# Patient Record
Sex: FEMALE | Race: WHITE | HISPANIC OR LATINO | Employment: UNEMPLOYED | URBAN - METROPOLITAN AREA
[De-identification: names, ages, dates, MRNs, and addresses within clinical notes are randomized per-mention and may not be internally consistent; named-entity substitution may affect disease eponyms.]

---

## 2017-01-05 ENCOUNTER — APPOINTMENT (OUTPATIENT)
Dept: LAB | Facility: HOSPITAL | Age: 37
End: 2017-01-05

## 2017-01-05 DIAGNOSIS — Z12.73 ENCOUNTER FOR SCREENING FOR MALIGNANT NEOPLASM OF OVARY: ICD-10-CM

## 2017-01-05 LAB — CANCER AG125 SERPL-ACNC: 7.7 U/ML (ref 0–30)

## 2017-01-05 PROCEDURE — 36415 COLL VENOUS BLD VENIPUNCTURE: CPT

## 2017-01-05 PROCEDURE — 86304 IMMUNOASSAY TUMOR CA 125: CPT

## 2017-02-08 ENCOUNTER — LAB REQUISITION (OUTPATIENT)
Dept: LAB | Facility: HOSPITAL | Age: 37
End: 2017-02-08

## 2017-02-08 ENCOUNTER — ALLSCRIPTS OFFICE VISIT (OUTPATIENT)
Dept: OTHER | Facility: OTHER | Age: 37
End: 2017-02-08

## 2017-02-08 DIAGNOSIS — Z11.3 ENCOUNTER FOR SCREENING FOR INFECTIONS WITH PREDOMINANTLY SEXUAL MODE OF TRANSMISSION: ICD-10-CM

## 2017-02-08 PROCEDURE — 87491 CHLMYD TRACH DNA AMP PROBE: CPT | Performed by: OBSTETRICS & GYNECOLOGY

## 2017-02-08 PROCEDURE — 87591 N.GONORRHOEAE DNA AMP PROB: CPT | Performed by: OBSTETRICS & GYNECOLOGY

## 2017-02-10 LAB
CHLAMYDIA DNA CVX QL NAA+PROBE: NORMAL
N GONORRHOEA DNA GENITAL QL NAA+PROBE: NORMAL

## 2017-03-30 ENCOUNTER — GENERIC CONVERSION - ENCOUNTER (OUTPATIENT)
Dept: OTHER | Facility: OTHER | Age: 37
End: 2017-03-30

## 2017-03-30 DIAGNOSIS — Z11.3 ENCOUNTER FOR SCREENING FOR INFECTIONS WITH PREDOMINANTLY SEXUAL MODE OF TRANSMISSION: ICD-10-CM

## 2018-01-09 NOTE — PROGRESS NOTES
Discussion/Summary  Discussion Summary:   29-year-old  with suspected endometriosis of her  scar  patient fully counseled to excision of this endometriosis  Hao Romero patient reports understanding consent and no further questions  Patient was advised that she should be nothing by mouth after midnight and to use the Hibiclens wash, she was given information on this  her surgery is already scheduled with Dr Simeon Fajardo and the GYN resident staff  Chief Complaint  Chief Complaint Free Text Note Form: Patient is here for H&P for surgery  History of Present Illness  HPI: patient is a 35- with history of 2 prior  sections  She has 2-3 cm x 2-3 cm tender lump in her  scar just to the right of the midline  This became larger after her second  section and after speaking with the physicians at her previous appointment she was able to take note that her pain does increase and the area does swell at the time of her menses  This area is tender all the time since her last  in  is not grown significantly but swells at the time of menses, is more tender, and goes back to its prior size about a week after her cycle  Patient had an ultrasound which showed this to be consistent with possible endometriosis of her scar  Patient was fully counseled to excision of the endometriosis nodule, we also discussed that if we found invasion deeper that more work may need to be done on the area or possibly mesh may be needed for repair though this is highly unlikely  I did discuss with her that the area would possibly be indented after surgery and would fill in somewhat  Hospital Based Practices Required Assessment:   Pain Assessment   the patient states they do not have pain  Abuse And Domestic Violence Screen    Yes, the patient is safe at home  The patient states no one is hurting them  Depression And Suicide Screen  No, the patient has not had thoughts of hurting themself  No, the patient has not felt depressed in the past 7 days  Prefered Language is  [place]  Primary Language is  [place]  Active Problems    1  Abdominal wall mass of suprapubic region (789 39) (R19 09)   2  Leg cramping (729 82) (R25 2)   3  Recurrent oral herpes simplex (054 2) (B00 2)   4  Well female exam with routine gynecological exam (V72 31) (Z01 419)    Surgical History    1  History of  Section    Family History    1  No pertinent family history    2  Family history of malignant neoplasm of uterus (V16 49) (Z80 49)    Social History    · Never a smoker    Current Meds   1  Acyclovir 400 MG Oral Tablet; Therapy: (Dilma Blower) to Recorded   2  No Reported Medications Recorded    Allergies    1  No Known Drug Allergies    Vitals  Vital Signs [Data Includes: Current Encounter]    Recorded: 97WSF3885 25:38XQ   Systolic 699   Diastolic 78   Height 5 ft 5 in   Weight 121 lb 6 oz   BMI Calculated 20 2   BSA Calculated 1 6     Physical Exam    Constitutional   General appearance: No acute distress, well appearing and well nourished  Pulmonary   Respiratory effort: No increased work of breathing or signs of respiratory distress  Auscultation of lungs: Clear to auscultation  Cardiovascular   Auscultation of heart: Normal rate and rhythm, normal S1 and S2, no murmurs  Abdomen   Abdomen: Abnormal   just of the right of the midline on her  section scar is a nodular tender area about 2 5 cm x 2 5 cm  Liver and spleen: No hepatomegaly or splenomegaly  Examination for hernias: No hernias appreciated  Psychiatric   Orientation to person, place, and time: Normal     Mood and affect: Normal        Results/Data  Encounter Results   Screen Ebola Flowsheet [id number] 09:40AM      Test Name Result Flag Reference   Exposure to Ebola Virus - Within 21 Days No         Attending Note  Attending Note: Attending Note: I interviewed and examined the patient        Signatures Electronically signed by : Sorin Dumont MD; Jan 21 2016 10:34AM EST                       (Author)

## 2018-01-12 VITALS
HEIGHT: 65 IN | DIASTOLIC BLOOD PRESSURE: 66 MMHG | SYSTOLIC BLOOD PRESSURE: 121 MMHG | WEIGHT: 126.5 LBS | BODY MASS INDEX: 21.08 KG/M2

## 2018-01-12 NOTE — MISCELLANEOUS
Provider Comments  Provider Comments:   pt no showed today      Signatures   Electronically signed by : Rocío Nieto, ; Nov 1 2016  2:47PM EST                       (Author)

## 2018-01-16 NOTE — PROGRESS NOTES
Discussion/Summary   Discussion Summary:    40 yo  sexually active female on seasonique presents c/o hx of herpes labialis  Oral HSV: acyclovir 400 mg bid for supression; valacyclovir 500 bid for acute outbreaks Rx sent      History of Present Illness   HPI: 40 yo  sexually active female on seasonique presents c/o hx of herpes labialis  Review of Systems   Focused-Female:      Constitutional: no fever-- and-- no chills  Integumentary: rash,-- skin wound-- and-- cold sore on lower lip  Active Problems   1  Dysmenorrhea (625 3) (N94 6)   2  Encounter for management and injection of depo-Provera (V25 49) (Z30 42)   3  Encounter to establish care with new doctor (V65 8) (Z76 89)   4  Endometriosis (617 9) (N80 9)   5  Endometriosis in scar of skin (617 6) (N80 6)   6  Leg cramping (729 82) (R25 2)   7  Oral herpes simplex, not currently active (054 9) (B00 9)   8  Ovarian cancer screening (V76 46) (Z12 73)   9  Recurrent oral herpes simplex (054 2) (B00 2)   10  Routine screening for STI (sexually transmitted infection) (V74 5) (Z11 3)   11  Surgical followup (V6 00) (Z09)   12  Well female exam with routine gynecological exam (V72 31) (Z01 419)    Surgical History   1  History of  Section    Family History   Mother    1  No pertinent family history  Aunt    2  Family history of malignant neoplasm of uterus (V16 49) (Z80 49)    Social History    · Never a smoker    Current Meds    1  Acyclovir 400 MG Oral Tablet; For chronic herpes labialis supression, take one 400 mg     tablet every 12 hours; Therapy: 33YQV4661 to (Evaluate:2018)  Requested for: 32ANN1376; Last     Rx:2017 Ordered   2  ALPRAZolam 0 5 MG Oral Tablet; one tablet by mouth one hour before office visit; Therapy: 63XED7671 to (Last Rx:2016) Ordered   3  Daily Value Multivitamin Oral Tablet; Therapy: (Recorded:73Odm8531) to Recorded   4   Hydrocodone-Ibuprofen 7 5-200 MG Oral Tablet; one tablet by mouth one hour before     office visit; Therapy: 26BNI8793 to (Last Rx:12Oct2016) Ordered   5  Levonorgest-Eth Estrad 91-Day 0 15-0 03 &0 01 MG Oral Tablet (Seasonique); TAKE 1     TABLET DAILY; Therapy: 47JYX6644 to (MKQVVLVH:60HUY0797)  Requested for: 90WPY2846; Last     Rx:30Mar2017 Ordered   6  ValACYclovir HCl - 500 MG Oral Tablet; For acute herpes labialis outbreaks, take one 500     mg tablet every 12 hours for 3 days; Therapy: 24AJV9414 to (Evaluate:22Jun2017)  Requested for: 30IAD3394; Last     Rx:30Mar2017 Ordered    Allergies   1  No Known Drug Allergies    Signatures    Electronically signed by : DEBBIE Paredes ; Jun 20 2017  3:25PM EST                       (Author)     Electronically signed by :  Kristina Montes, ; Jan 17 2018  4:07PM EST                       (Administrative)

## 2018-01-22 VITALS
DIASTOLIC BLOOD PRESSURE: 79 MMHG | SYSTOLIC BLOOD PRESSURE: 116 MMHG | WEIGHT: 124 LBS | BODY MASS INDEX: 20.66 KG/M2 | HEIGHT: 65 IN

## 2018-03-07 NOTE — PROGRESS NOTES
Discussion/Summary    44yo  with recurrent  scar endometrioma scheduled for resection on   She had a prior scar revision which showed endometriosis  She is again having the pain  Due to her self pay status, an attempt will be made to excise the lesion in clinic with local anesthetic  ABD USN showed 1 8x0 9cm solid nodule with internal blood flow in the superficial soft tissues anterior to the rectus muscle    She will be seen on 10/12 for evaluation by Dr Demetrius Peñaloza and be scheduled for an in office resection depending on exam findings        Signatures   Electronically signed by : DEBBIE Springer ; Oct  5 2016  4:48PM EST                       (Author)    Electronically signed by : Phuong Robles DO; Oct  6 2016 10:10PM EST

## 2018-03-07 NOTE — PROGRESS NOTES
Discussion/Summary    After chart review; I concur with proceeding to OR for planned excision of abdominal wall endometriosis given recurrent pain  I would avoid delay in surgical management due significant disturbance of quality of life        Signatures   Electronically signed by : Herman Montiel DO; Sep 28 2016  1:33PM EST                       (Author)

## 2018-09-02 ENCOUNTER — TRANSCRIBE ORDERS (OUTPATIENT)
Dept: URGENT CARE | Age: 38
End: 2018-09-02

## 2018-09-02 ENCOUNTER — OFFICE VISIT (OUTPATIENT)
Dept: URGENT CARE | Age: 38
End: 2018-09-02

## 2018-09-02 VITALS
HEART RATE: 62 BPM | HEIGHT: 65 IN | WEIGHT: 132 LBS | OXYGEN SATURATION: 100 % | TEMPERATURE: 98.1 F | BODY MASS INDEX: 21.99 KG/M2 | DIASTOLIC BLOOD PRESSURE: 72 MMHG | RESPIRATION RATE: 12 BRPM | SYSTOLIC BLOOD PRESSURE: 122 MMHG

## 2018-09-02 DIAGNOSIS — M79.671 RIGHT FOOT PAIN: ICD-10-CM

## 2018-09-02 DIAGNOSIS — M77.51 TENDINITIS OF RIGHT FOOT: Primary | ICD-10-CM

## 2018-09-02 PROCEDURE — G0382 LEV 3 HOSP TYPE B ED VISIT: HCPCS

## 2018-09-02 RX ORDER — MULTIVIT-MIN/IRON FUM/FOLIC AC 7.5 MG-4
1 TABLET ORAL DAILY
COMMUNITY

## 2018-09-02 RX ORDER — NAPROXEN 500 MG/1
500 TABLET ORAL 2 TIMES DAILY WITH MEALS
Qty: 20 TABLET | Refills: 0 | Status: SHIPPED | OUTPATIENT
Start: 2018-09-02 | End: 2021-01-21 | Stop reason: ALTCHOICE

## 2018-09-02 NOTE — PATIENT INSTRUCTIONS
No acute fracture on xray  Will start Naproxen BID  Rest   Ice as needed  Follow up with Podiatry for continued pain

## 2018-09-03 NOTE — PROGRESS NOTES
[facility]        NAME: London Conway is a 40 y o  female  : 1980    MRN: [id number]  DATE: 2018  TIME: 8:55 PM    Assessment and Plan   Tendinitis of right foot [M77 51]  1  Tendinitis of right foot     2  Right foot pain  XR foot 3+ vw right    naproxen (NAPROSYN) 500 mg tablet         Patient Instructions     Patient Instructions   No acute fracture on xray  Will start Naproxen BID  Rest   Ice as needed  Follow up with Podiatry for continued pain  Chief Complaint     Chief Complaint   Patient presents with    Foot Pain     across the top of the foot    with stepping         History of Present Illness   Jeanna García presents to the clinic c/o    This is 40year old female here today with right foot pain  She denies any injury  Pain is over the top of her foot  Pain is worse with walking  She has not tried any ice or motrin  She has had pain for about 4 weeks  Review of Systems   Review of Systems   Constitutional: Negative  HENT: Negative  Respiratory: Negative  Cardiovascular: Negative  Musculoskeletal: Positive for arthralgias  Psychiatric/Behavioral: Negative            Current Medications     Long-Term Prescriptions   Medication Sig Dispense Refill    Multiple Vitamins-Minerals (MULTIVITAMIN WITH MINERALS) tablet Take 1 tablet by mouth daily      naproxen (NAPROSYN) 500 mg tablet Take 1 tablet (500 mg total) by mouth 2 (two) times a day with meals 20 tablet 0       Current Allergies     Allergies as of 2018    (No Known Allergies)            The following portions of the patient's history were reviewed and updated as appropriate: allergies, current medications, past family history, past medical history, past social history, past surgical history and problem list     Objective   /72   Pulse 62   Temp 98 1 °F (36 7 °C) (Temporal)   Resp 12   Ht 5' 5" (1 651 m)   Wt 59 9 kg (132 lb)   LMP 2016 (Approximate) SpO2 100%   BMI 21 97 kg/m²        Physical Exam     Physical Exam   Constitutional: She is oriented to person, place, and time  Cardiovascular: Normal rate, regular rhythm and normal heart sounds  Musculoskeletal:   TTP over the dorsum of foot near the 3rd and 4th toes, bruising  Full ROM  Neurological: She is alert and oriented to person, place, and time  Psychiatric: She has a normal mood and affect   Her behavior is normal      Xray: no acute fracture

## 2019-02-18 ENCOUNTER — OFFICE VISIT (OUTPATIENT)
Dept: OBGYN CLINIC | Facility: CLINIC | Age: 39
End: 2019-02-18

## 2019-02-18 VITALS
SYSTOLIC BLOOD PRESSURE: 124 MMHG | BODY MASS INDEX: 22.66 KG/M2 | DIASTOLIC BLOOD PRESSURE: 69 MMHG | WEIGHT: 136 LBS | HEIGHT: 65 IN | HEART RATE: 72 BPM

## 2019-02-18 DIAGNOSIS — B00.1 HERPES LABIALIS: ICD-10-CM

## 2019-02-18 DIAGNOSIS — Z01.419 ENCOUNTER FOR WELL WOMAN EXAM WITH ROUTINE GYNECOLOGICAL EXAM: ICD-10-CM

## 2019-02-18 DIAGNOSIS — Z72.51 HIGH RISK SEXUAL BEHAVIOR, UNSPECIFIED TYPE: Primary | ICD-10-CM

## 2019-02-18 PROCEDURE — 87491 CHLMYD TRACH DNA AMP PROBE: CPT | Performed by: OBSTETRICS & GYNECOLOGY

## 2019-02-18 PROCEDURE — 99395 PREV VISIT EST AGE 18-39: CPT | Performed by: OBSTETRICS & GYNECOLOGY

## 2019-02-18 PROCEDURE — 87591 N.GONORRHOEAE DNA AMP PROB: CPT | Performed by: OBSTETRICS & GYNECOLOGY

## 2019-02-18 RX ORDER — VALACYCLOVIR HYDROCHLORIDE 500 MG/1
TABLET, FILM COATED ORAL
COMMUNITY
Start: 2017-03-30 | End: 2020-07-29 | Stop reason: SDUPTHER

## 2019-02-18 RX ORDER — ACYCLOVIR 400 MG/1
TABLET ORAL
COMMUNITY
Start: 2017-03-30 | End: 2019-02-18 | Stop reason: SDUPTHER

## 2019-02-18 RX ORDER — ACYCLOVIR 400 MG/1
400 TABLET ORAL
Qty: 35 TABLET | Refills: 5 | Status: SHIPPED | OUTPATIENT
Start: 2019-02-18 | End: 2020-03-23 | Stop reason: SDUPTHER

## 2019-02-19 LAB
C TRACH DNA SPEC QL NAA+PROBE: NEGATIVE
N GONORRHOEA DNA SPEC QL NAA+PROBE: NEGATIVE

## 2019-02-19 NOTE — PROGRESS NOTES
Ruiz Theodore is a 45 y o  female who presents for annual well woman exam  Periods are regular every 28-30 days, lasting 4 days  No intermenstrual bleeding, spotting, or discharge  Pt has a hx of  scar endometriosis and herpes labialis  Reports an outbreak with sores inside her mouth about 2 weeks ago  GYN:  · No vaginal discharge, labial erythema or lesions, dyspareunia     · Menses are regular  · Contraception: vasectomy  · Patient is sexually active with 1 male partner  · Last pap smear was in   Results were NILM/HPV negative  · No gynecologic surgeries  OB:  ·  female  · Pregnancies were complicated by  x2, endometriosis of  scar  :  · No dysuria, urinary frequency or urgency  · No hematuria, flank pain, incontinence  Breast:  · Denies breast mass, skin changes, dimpling, reddening, nipple retraction  · No breast discharge  · Patient does not have a family history of breast, endometrial, or ovarian ca  General:  · Diet: counseled  · Exercise: walks  · Work: works cleaning houses intermittently   · ETOH use: no  · Tobacco use: no  · Recreational drug use: no    Screening:  · Cervical cancer: last pap smear in   Results were NILM/HPV negative  · Breast cancer: N/A  · Colon cancer: N/A  · STD screening: requesting testing today  Review of Systems  Pertinent items are noted in HPI          Objective      /69 (BP Location: Right arm, Patient Position: Sitting, Cuff Size: Standard)   Pulse 72   Ht 5' 5" (1 651 m)   Wt 61 7 kg (136 lb)   LMP 2019 (Within Days)   BMI 22 63 kg/m²     General:   alert, appears stated age and cooperative   Heart: regular rate and rhythm   Lungs: clear to auscultation bilaterally   Breast: breasts appear normal, no suspicious masses, no skin or nipple changes or axillary nodes   Abdomen: soft, non-tender, without masses or organomegaly and  incision scar hardened to touch Vulva: normal   Vagina: normal mucosa, normal discharge   Cervix: speculum exam deferred   Uterus: normal size, anteverted, mobile, non-tender   Adnexa: normal adnexa and no mass, fullness, tenderness          Assessment/Plan:  Problem List Items Addressed This Visit     None      Visit Diagnoses     High risk sexual behavior, unspecified type    -  Primary    Relevant Orders    Chlamydia/GC amplified DNA by PCR    Herpes labialis        Relevant Medications    valACYclovir (VALTREX) 500 mg tablet    acyclovir (ZOVIRAX) 400 MG tablet    Encounter for well woman exam with routine gynecological exam                  Agnieszka Mai MD  OB/GYN PGY-1  2/19/2019  11:09 AM

## 2019-07-22 ENCOUNTER — OFFICE VISIT (OUTPATIENT)
Dept: URGENT CARE | Age: 39
End: 2019-07-22
Payer: COMMERCIAL

## 2019-07-22 VITALS
TEMPERATURE: 98.1 F | DIASTOLIC BLOOD PRESSURE: 69 MMHG | SYSTOLIC BLOOD PRESSURE: 134 MMHG | WEIGHT: 130.8 LBS | BODY MASS INDEX: 21.79 KG/M2 | OXYGEN SATURATION: 100 % | HEART RATE: 62 BPM | RESPIRATION RATE: 16 BRPM | HEIGHT: 65 IN

## 2019-07-22 DIAGNOSIS — Z02.4 DRIVER'S PERMIT PHYSICAL EXAMINATION: Primary | ICD-10-CM

## 2019-07-22 NOTE — PROGRESS NOTES
[facility]        NAME: Shell Christianson is a 45 y o  female  : 1980    MRN: [id number]  DATE: 2019  TIME: 6:42 PM    Assessment and Plan   's permit physical examination [Z02 4]  1  's permit physical examination           Patient Instructions     Patient Instructions   Recheck/follow-up with an ophthalmologist for another opinion regarding possible vision correction of the right eye  Janneth Bess   [phone]        Chief Complaint     Chief Complaint   Patient presents with    Annual Exam     Pt is here today for a drivers permit exam         History of Present Illness       Patient is here for a learner's permit physical; patient states she has 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; patient states she has seen an ophthalmologist who recommended use of a contact which patient states did not help her      Review of Systems   Review of Systems   Eyes:        Poor vision in the right eye   All other systems reviewed and are negative  Current Medications       Current Outpatient Medications:     Multiple Vitamins-Minerals (MULTIVITAMIN WITH MINERALS) tablet, Take 1 tablet by mouth daily, Disp: , Rfl:     acyclovir (ZOVIRAX) 400 MG tablet, Take 1 tablet (400 mg total) by mouth 5 (five) times a day for 7 days, Disp: 35 tablet, Rfl: 5    naproxen (NAPROSYN) 500 mg tablet, Take 1 tablet (500 mg total) by mouth 2 (two) times a day with meals (Patient not taking: Reported on 2019), Disp: 20 tablet, Rfl: 0    valACYclovir (VALTREX) 500 mg tablet, Take by mouth, Disp: , Rfl:     Current Allergies     Allergies as of 2019    (No Known Allergies)            The following portions of the patient's history were reviewed and updated as appropriate: allergies, current medications, past family history, past medical history, past social history, past surgical history and problem list      History reviewed  No pertinent past medical history      Past Surgical History:   Procedure Laterality Date     SECTION      HYSTERECTOMY N/A 2016    Procedure: EXCISION  SCAR ENDOMETRIOSIS painful nodule;  Surgeon: Ernestine Quintana MD;  Location: [facility];  Service:        Family History   Problem Relation Age of Onset    No Known Problems Mother     No Known Problems Father          Medications have been verified  Objective   /69 (BP Location: Left arm, Patient Position: Sitting)   Pulse 62   Temp 98 1 °F (36 7 °C) (Temporal)   Resp 16   Ht 5' 5" (1 651 m)   Wt 59 3 kg (130 lb 12 8 oz)   LMP 2019   SpO2 100%   BMI 21 77 kg/m²        Physical Exam     Physical Exam   Constitutional: She is oriented to person, place, and time  She appears well-developed and well-nourished  HENT:   Right Ear: External ear normal    Left Ear: External ear normal    Nose: Nose normal    Mouth/Throat: Oropharynx is clear and moist    Eyes: Pupils are equal, round, and reactive to light  Conjunctivae and EOM are normal    Neck: Normal range of motion  Neck supple  Cardiovascular: Normal rate, regular rhythm and normal heart sounds  Pulmonary/Chest: Effort normal and breath sounds normal    Musculoskeletal: Normal range of motion  Neurological: She is alert and oriented to person, place, and time  Skin:   Good color and turgor   Psychiatric: She has a normal mood and affect  Her behavior is normal    Nursing note and vitals reviewed

## 2019-07-22 NOTE — PATIENT INSTRUCTIONS
Recheck/follow-up with an ophthalmologist for another opinion regarding possible vision correction of the right eye    Mariana Khan   [phone]

## 2020-01-14 ENCOUNTER — OFFICE VISIT (OUTPATIENT)
Dept: OBGYN CLINIC | Facility: CLINIC | Age: 40
End: 2020-01-14

## 2020-01-14 VITALS
BODY MASS INDEX: 21.83 KG/M2 | HEART RATE: 68 BPM | HEIGHT: 65 IN | WEIGHT: 131 LBS | SYSTOLIC BLOOD PRESSURE: 120 MMHG | DIASTOLIC BLOOD PRESSURE: 78 MMHG

## 2020-01-14 DIAGNOSIS — N63.20 LEFT BREAST MASS: Primary | ICD-10-CM

## 2020-01-14 PROCEDURE — 99213 OFFICE O/P EST LOW 20 MIN: CPT | Performed by: STUDENT IN AN ORGANIZED HEALTH CARE EDUCATION/TRAINING PROGRAM

## 2020-01-14 NOTE — PROGRESS NOTES
Subjective      Irvin Pierre is a 44 y o  female who presents with concern for breast lump  Pt reports she first noticed a lump one week ago, on her left breast  Noted while in shower  Not painful  Has remained the same size since she first noticed it  Has never had a mammogram performed before  LMP: 2 years ago, has hx endometriosis  Reports she still has her uterus  She has a questionable family history of ovarian cancer; she is quite adamant that this occurred in her uncle  No other family history of cancers  Menstrual History:  OB History        2    Para   2    Term   2            AB        Living   2       SAB        TAB        Ectopic        Multiple        Live Births                    No LMP recorded  The following portions of the patient's history were reviewed and updated as appropriate: allergies, current medications, past family history, past medical history, past social history, past surgical history and problem list     Review of Systems  Pertinent items are noted in HPI  Objective      /78 (BP Location: Left arm, Patient Position: Sitting, Cuff Size: Standard)   Pulse 68   Ht 5' 5" (1 651 m)   Wt 59 4 kg (131 lb)   LMP  (LMP Unknown)   BMI 21 80 kg/m²     General:   alert, appears stated age and cooperative   Heart: regular rate and rhythm, S1, S2 normal, no murmur, click, rub or gallop   Lungs: clear to auscultation bilaterally   Abdomen: soft, non-tender, without masses or organomegaly                       Breast exam: Right breast small and grossly normal without masses or lesions  Left breast reveals no gross visible lesions  There is noted to be a small, nontender, mobile, non-fixed mass in the upper outer quadrant  Assessment   38yo P2 presenting with concern for a breast lump     Plan   Breast Lump:  - Palpated on examination today  - Discussed breast ultrasound vs  Mammography today   Discussed with pt that, with her being less than a year away from mammography recommendations, would recommend proceeding with mammography today  - Discussed the possibility of call-back from the breast imaging center  - Discussed that the breast center usually coordinates follow up care, but if she has questions or concerns, she can always call our office    - To follow up to discuss results or PRN    Discussed with Dr Amalia Brennan DO  OB/GYN, PGY4  1/17/2020, 2:18 PM

## 2020-01-17 ENCOUNTER — HOSPITAL ENCOUNTER (OUTPATIENT)
Dept: ULTRASOUND IMAGING | Facility: CLINIC | Age: 40
Discharge: HOME | End: 2020-01-17
Payer: COMMERCIAL

## 2020-01-17 ENCOUNTER — HOSPITAL ENCOUNTER (OUTPATIENT)
Dept: MAMMOGRAPHY | Facility: CLINIC | Age: 40
Discharge: HOME | End: 2020-01-17
Payer: COMMERCIAL

## 2020-01-17 ENCOUNTER — TRANSCRIBE ORDERS (OUTPATIENT)
Dept: MAMMOGRAPHY | Facility: CLINIC | Age: 40
End: 2020-01-17

## 2020-01-17 VITALS — WEIGHT: 131 LBS | BODY MASS INDEX: 21.83 KG/M2 | HEIGHT: 65 IN

## 2020-01-17 DIAGNOSIS — N63.25 BREAST LUMP ON LEFT SIDE AT 3 O'CLOCK POSITION: ICD-10-CM

## 2020-01-17 DIAGNOSIS — R92.8 ABNORMAL FINDINGS ON DIAGNOSTIC IMAGING OF BREAST: Primary | ICD-10-CM

## 2020-01-17 DIAGNOSIS — N63.20 LEFT BREAST MASS: Primary | ICD-10-CM

## 2020-01-17 DIAGNOSIS — N63.20 LEFT BREAST MASS: ICD-10-CM

## 2020-01-17 PROCEDURE — 76642 ULTRASOUND BREAST LIMITED: CPT

## 2020-01-17 PROCEDURE — 77066 DX MAMMO INCL CAD BI: CPT

## 2020-01-17 PROCEDURE — G0279 TOMOSYNTHESIS, MAMMO: HCPCS

## 2020-03-23 DIAGNOSIS — B00.1 HERPES LABIALIS: ICD-10-CM

## 2020-03-24 RX ORDER — ACYCLOVIR 400 MG/1
400 TABLET ORAL
Qty: 35 TABLET | Refills: 5 | Status: SHIPPED | OUTPATIENT
Start: 2020-03-24 | End: 2020-07-29

## 2020-07-17 ENCOUNTER — HOSPITAL ENCOUNTER (OUTPATIENT)
Dept: ULTRASOUND IMAGING | Facility: CLINIC | Age: 40
Discharge: HOME | End: 2020-07-17
Payer: COMMERCIAL

## 2020-07-17 VITALS — WEIGHT: 134 LBS | HEIGHT: 65 IN | BODY MASS INDEX: 22.33 KG/M2

## 2020-07-17 DIAGNOSIS — R92.8 ABNORMAL FINDINGS ON DIAGNOSTIC IMAGING OF BREAST: ICD-10-CM

## 2020-07-17 PROCEDURE — 76642 ULTRASOUND BREAST LIMITED: CPT

## 2020-07-22 ENCOUNTER — TELEPHONE (OUTPATIENT)
Dept: OBGYN CLINIC | Age: 40
End: 2020-07-22

## 2020-07-22 NOTE — TELEPHONE ENCOUNTER
Patient realized she called wrong office, she is a patient at [address] [place] [address], [address], # provided for patient to call

## 2020-07-22 NOTE — TELEPHONE ENCOUNTER
Patient called saying she needs new order for Mammogram so she can call to schedule  Please call patient back at [phone] to let her know order is in, thanks!

## 2020-07-29 ENCOUNTER — ANNUAL EXAM (OUTPATIENT)
Dept: OBGYN CLINIC | Facility: CLINIC | Age: 40
End: 2020-07-29

## 2020-07-29 ENCOUNTER — TELEPHONE (OUTPATIENT)
Dept: OBGYN CLINIC | Facility: CLINIC | Age: 40
End: 2020-07-29

## 2020-07-29 VITALS
HEIGHT: 65 IN | TEMPERATURE: 98.7 F | BODY MASS INDEX: 22.49 KG/M2 | HEART RATE: 71 BPM | SYSTOLIC BLOOD PRESSURE: 136 MMHG | DIASTOLIC BLOOD PRESSURE: 84 MMHG | WEIGHT: 135 LBS

## 2020-07-29 DIAGNOSIS — B00.1 HERPES LABIALIS: ICD-10-CM

## 2020-07-29 DIAGNOSIS — N63.20 LEFT BREAST MASS: ICD-10-CM

## 2020-07-29 DIAGNOSIS — Z01.419 ENCOUNTER FOR GYNECOLOGICAL EXAMINATION WITHOUT ABNORMAL FINDING: ICD-10-CM

## 2020-07-29 DIAGNOSIS — Z11.3 ROUTINE SCREENING FOR STI (SEXUALLY TRANSMITTED INFECTION): Primary | ICD-10-CM

## 2020-07-29 PROCEDURE — G0124 SCREEN C/V THIN LAYER BY MD: HCPCS | Performed by: PATHOLOGY

## 2020-07-29 PROCEDURE — G0145 SCR C/V CYTO,THINLAYER,RESCR: HCPCS | Performed by: PATHOLOGY

## 2020-07-29 PROCEDURE — 87624 HPV HI-RISK TYP POOLED RSLT: CPT | Performed by: NURSE PRACTITIONER

## 2020-07-29 PROCEDURE — 87491 CHLMYD TRACH DNA AMP PROBE: CPT | Performed by: NURSE PRACTITIONER

## 2020-07-29 PROCEDURE — 87591 N.GONORRHOEAE DNA AMP PROB: CPT | Performed by: NURSE PRACTITIONER

## 2020-07-29 PROCEDURE — 99395 PREV VISIT EST AGE 18-39: CPT | Performed by: NURSE PRACTITIONER

## 2020-07-29 RX ORDER — VALACYCLOVIR HYDROCHLORIDE 500 MG/1
500 TABLET, FILM COATED ORAL 2 TIMES DAILY
Qty: 6 TABLET | Refills: 6 | Status: SHIPPED | OUTPATIENT
Start: 2020-07-29 | End: 2021-01-21

## 2020-07-29 NOTE — PROGRESS NOTES
ASSESSMENT & PLAN: Pardeep Yeager is a 44 y o  [id number] with normal gynecologic exam     1   Routine well woman exam done today  2  Pap and HPV:  The patient's last pap and hpv was 2015 and was negative  Pap and cotesting was done today  Will call results to pt  Current ASCCP Guidelines reviewed  If results are normal will pap with co-testing in 2025   3  The following were reviewed in today's visit: breast self exam, STD testing, adequate intake of calcium and vitamin D, exercise and healthy diet  GC and chlamydia testing was done today and patient has consented to receive serum testing for HIV, Hepatitis B, and RPR  Will call pt with results  Patient was provided with information for gardasil vaccine  4  Cysts in left breast: Last breast US was done on 2020 which showed two complex cysts which appeared benign, will follow up with bilateral diagnostic mammogram and US of left breast in 2021    5  History of endometriosis with implants on  scar  Will continue combination OCPs and follow up in 1 year  Reviewed ACHES  She gets her OCP's from [facility], she is on Raquel 35   6  Hx of hSV labialis  Not currently active  Valtrex was reordered per pt's request     CC:  Annual Gynecologic Examination    HPI: Pardeep Yeager is a 44 y o  [id number] who presents for annual gynecologic examination  She had a c/s  scar nodule removed 2016 - benign , with scattered foci of endometriosis  She had another tissue sample done of c/s scar on  10/19/2016- endometrioma x2, endometriosis  She states that experiences mild pain which she describes as cramping in her pelvis and c/s scar monthly but states that her symptoms are well controlled with her combination OCPs and wishes to remain on them  She receives Raquel 35 from [facility]  Hx of hSV labialis, she takes Valtrex x3 days during flares  She has the following concerns:  Ovarian cysts seen on imaging   She denies any progression of left breast mass, nipple discharge, or breast pain  US results from 2020 were reviewed with patient  Recommendations are to follow up with bilateral diagnostic mammography and left breast US in 6 months  Health Maintenance:    She wears her seatbelt routinely  She does not perform regular monthly self breast exams  She feels safe at home  No past medical history on file  Past Surgical History:   Procedure Laterality Date     SECTION      HYSTERECTOMY N/A 2016    Procedure: EXCISION  SCAR ENDOMETRIOSIS painful nodule;  Surgeon: Jolly Lennox, MD;  Location: [facility];  Service:        Past OB/Gyn History:  OB History        2    Para   2    Term   2            AB        Living   2       SAB        TAB        Ectopic        Multiple        Live Births                   Pt does not have menstrual issues  Amenorrheic on OCPs  History of sexually transmitted infection: No    History of abnormal pap smears: Yes  In  per patient report  Follow up pap and co-testing have been negative  Patient is currently sexually active  heterosexual   The current method of family planning is vasectomy       Family History   Problem Relation Age of Onset    No Known Problems Mother     No Known Problems Father     No Known Problems Sister     No Known Problems Daughter     No Known Problems Maternal Grandmother     No Known Problems Maternal Grandfather     No Known Problems Paternal Grandmother     No Known Problems Paternal Grandfather     No Known Problems Son     Endometrial cancer Maternal Aunt     No Known Problems Paternal Aunt     Cancer Paternal Aunt         type unknown       Social History:  Social History     Socioeconomic History    Marital status: /Civil Union     Spouse name: Not on file    Number of children: 2    Years of education: Not on file    Highest education level: Not on file   Occupational History    Occupation: cleaning Social Needs    Financial resource strain: Not on file    Food insecurity:     Worry: Not on file     Inability: Not on file    Transportation needs:     Medical: Not on file     Non-medical: Not on file   Tobacco Use    Smoking status: Never Smoker    Smokeless tobacco: Never Used   Substance and Sexual Activity    Alcohol use: No    Drug use: No    Sexual activity: Yes     Partners: Male   Lifestyle    Physical activity:     Days per week: 6 days     Minutes per session: 90 min    Stress: Only a little   Relationships    Social connections:     Talks on phone: Patient refused     Gets together: Patient refused     Attends Restorationism service: Patient refused     Active member of club or organization: Patient refused     Attends meetings of clubs or organizations: Patient refused     Relationship status: Patient refused    Intimate partner violence:     Fear of current or ex partner: Patient refused     Emotionally abused: Patient refused     Physically abused: Patient refused     Forced sexual activity: Patient refused   Other Topics Concern    Not on file   Social History Narrative    Not on file     Presently lives with  and children  Patient is     Patient is currently employed     No Known Allergies      Current Outpatient Medications:     acyclovir (ZOVIRAX) 400 MG tablet, Take 1 tablet (400 mg total) by mouth 5 (five) times a day for 7 days, Disp: 35 tablet, Rfl: 5    Multiple Vitamins-Minerals (MULTIVITAMIN WITH MINERALS) tablet, Take 1 tablet by mouth daily, Disp: , Rfl:     naproxen (NAPROSYN) 500 mg tablet, Take 1 tablet (500 mg total) by mouth 2 (two) times a day with meals, Disp: 20 tablet, Rfl: 0    valACYclovir (VALTREX) 500 mg tablet, Take by mouth, Disp: , Rfl:       Review of Systems  Constitutional :no fever, feels well, no tiredness, no recent weight gain or loss  ENT: no ear ache, no loss of hearing, no nosebleeds or nasal discharge, no sore throat or hoarseness  Cardiovascular: no complaints of slow or fast heart beat, no chest pain, no palpitations, no leg claudication or lower extremity edema  Respiratory: no complaints of shortness of shortness of breath, no RUBIO  Breasts:no complaints of breast pain, breast lump, or nipple discharge  Gastrointestinal: no complaints of abdominal pain, constipation, nausea, vomiting, or diarrhea or bloody stools  Genitourinary : no complaints of dysuria, incontinence, pelvic pain, no dysmenorrhea, vaginal discharge or abnormal vaginal bleeding and as noted in HPI  Musculoskeletal: no complaints of arthralgia, no myalgia, no joint swelling or stiffness, no limb pain or swelling  Integumentary: no complaints of skin rash or lesion, itching or dry skin  Neurological: no complaints of headache, no confusion, no numbness or tingling, no dizziness or fainting    Objective      LMP  (LMP Unknown)   General:   appears stated age, cooperative, alert normal mood and affect   Neck: normal, supple,trachea midline, no masses   Heart: regular rate and rhythm, S1, S2 normal, no murmur, click, rub or gallop   Lungs: clear to auscultation bilaterally   Breasts: abnormal findings: round, firm and mobile nodule located on the left 3cm lateral to areola  Abdomen: soft, non-tender, without organomegaly  Faded c/s scar with multiple, non-tender, mobile, underlying nodules  Consistent with endometriosis implants  Vulva: normal, Bartholin's, Urethra, Ingram normal   Vagina: normal vagina, no discharge, exudate, lesion, or erythema   Urethra: normal   Cervix: Normal, no discharge  PAP done  GCC done  Nontender  Uterus: normal size, contour, position, consistency, mobility, non-tender and anteverted   Adnexa: normal adnexa and no mass, fullness, tenderness   Lymphatic palpation of lymph nodes in neck, axilla, groin and/or other locations: no lymphadenopathy or masses noted   Skin normal skin turgor and no rashes     Psychiatric orientation to person, place, and time: normal  mood and affect: normal     Patient was seen by Flo Cabot PA-S and Monik Armstrong

## 2020-07-29 NOTE — PATIENT INSTRUCTIONS
Covid - 19 instructions    If you are having any of the following     Cough   Shortness of breath   Fever  If traveled internationally or to high risk [place] states  Or been in contact with someone that has     Please call:    [phone]  option [phone]    They will screen you and direct you to the nearest testing location     Should not come to the PCP or OB office without calling that number first        Oral Contraceptives (By mouth)   Prevents pregnancy  Oral contraceptives are birth control pills  Brand Name(s): Aftera, Altavera, Alyacen 1/35, Alyacen 7/7/7, Amethia, Amethia Lo, Maria Elena, Apri, [place], Danis, [place], Chanel Willis, [facility], Frantz, [place] 24 Fe   There may be other brand names for this medicine  When This Medicine Should Not Be Used: You should not use this medicine if you have had an allergic reaction to oral contraceptives, or if you are pregnant  Do not use this medicine if you have breast cancer, cancer of the uterus, diabetes, heart disease, high blood pressure, or a history of blood clots, heart attack, or stroke  Do not use this medicine if you have problems with your liver (such as liver tumor), jaundice (yellowish eyes or skin), certain types of headaches, unusual vaginal bleeding, or if you are having a surgery that needs bedrest    How to Use This Medicine:   Tablet, Chewable Tablet, Coated Tablet  · Your doctor will tell you how much medicine to use  Do not use more than directed  · Read and follow the patient instructions that come with this medicine  Talk to your doctor or pharmacist if you have any questions  · You may take this medicine with food to lessen stomach upset  · Keep your pills in the container you receive from the pharmacy  Take the pills in the order they appear in the container  · Take your pill at the same time every day  Swallow the tablet whole  Do not crush, break, or chew it    · If you are using the chewable tablets, you may chew the tablet completely before swallowing  Drink a full glass (8 ounces) of water right after swallowing  If a dose is missed:   · If one dose is missed: Take the missed dose as soon as you remember  Take 2 tablets if you do not remember until the next day  Ask your doctor or pharmacist if you need to USE ANOTHER KIND OF BIRTH CONTROL until your period begins  · If you miss more than one dose, read and follow the instructions on the package about missing doses carefully  Ask your doctor or pharmacist if you need more information  How to Store and Dispose of This Medicine:   · Store the medicine in a closed container at room temperature, away from heat, moisture, and direct light  · Ask your pharmacist, doctor, or health caregiver about the best way to dispose of any outdated medicine or medicine no longer needed  · Keep all medicine out of the reach of children  Never share your medicine with anyone  Drugs and Foods to Avoid:   Ask your doctor or pharmacist before using any other medicine, including over-the-counter medicines, vitamins, and herbal products  · Make sure your doctor knows if you are using antibiotics (such as ampicillin, rifampin, tetracycline, Omnipen®, Rimactane®) or antifungals (such as griseofulvin, Grifulvin V®), medicine for seizures (such as phenobarbital, phenylbutazone, phenytoin, carbamazepine, felbamate, oxcarbazepine, topiramate, primidone, Luminal®, Dilantin®, Tegretol®, Felbatol®, Trileptal®, Topamax®, Mysoline®), modafinil (Provigil®), or medicine to treat HIV or AIDS (such as ritonavir, Norvir®)    · Tell your doctor if you are also using St  Korey's Wort, atorvastatin (Lipitor®), vitamin C (ascorbic acid), acetaminophen (Tylenol®), itraconazole (Sporanox®), ketoconazole (Nizoral®), cyclosporine (Gengraf®, Neoral®, Sandimmune®), prednisolone (Delta Cortef®, Prelone®), theophylline (Toby-Dur®, Slo-Phyllin®, Gyrocaps®), temazepam (Restoril®), morphine (Astramorph PF®, Duramorph®, Avinza®, MS Contin®, Roxanol®), or salicylic acid  Warnings While Using This Medicine:   · Although you are using this medicine to prevent pregnancy, you should know that using this medicine while you are pregnant could harm the unborn baby  If you think you have become pregnant while using the medicine, tell your doctor right away  · Use a different kind of birth control during the first 3 weeks of oral contraceptive use to make sure you are protected from pregnancy  · Make sure your doctor knows if you are breastfeeding, or if you have lupus, edema (fluid retention), seizure disorder, asthma, migraine headaches, or a history of depression  Tell your doctor if have breast lumps, high cholesterol, gallbladder disease, liver disease, kidney disease, or irregular monthly periods  · This medicine will not protect you from getting HIV/AIDS or other sexually transmitted diseases  If this is a concern for you, talk with your doctor  · If you smoke while using birth control pills, you increase your risk of having a heart attack, stroke, or blood clot  Your risk is even higher if you are over age 28, if you have diabetes, high blood pressure, high cholesterol, or if you are overweight  Talk with your doctor about ways to stop smoking  Keep your diabetes under control  Ask your doctor about diet and exercise to control your weight and blood cholesterol level  · Tell any doctor or dentist who treats you that you are using this medicine  You may need to stop using this medicine several days before you have surgery or medical tests  · Check with your eye doctor if you wear contact lenses and you have vision problems or eye discomfort  · You should see your doctor on a regular basis (every 6 months or 1 year) while taking birth control pills  · If you miss two periods in a row, call your doctor for a pregnancy test before you take any more pills    · It is best to wait 2 or 3 months after stopping birth control pills before you try to get pregnant  Possible Side Effects While Using This Medicine:   Call your doctor right away if you notice any of these side effects:  · Allergic reaction: Itching or hives, swelling in your face or hands, swelling or tingling in your mouth or throat, chest tightness, trouble breathing  · Chest pain, shortness of breath, or coughing up blood  · Heavy vaginal bleeding  · Irregular or missed menstrual period  · Lumps in breast   · Nausea, vomiting, loss of appetite, pain in your upper stomach  · Numbness or weakness in your arm or leg, or on one side of your body  · Pain in your lower leg (calf)  · Rapid weight gain  · Sudden or severe headache, problems with vision, speech, or walking  · Swelling in your hands, ankles, or feet  · Yellowing of your skin or the whites of your eyes  If you notice these less serious side effects, talk with your doctor:   · Bloated feeling  · Breast tenderness, pain, swelling, or discharge  · Changes in appetite  · Contact lens discomfort  · Depression or mood changes  · Mild headache  · Mild skin rash or itching, or change in skin color  · Sensitivity to sunlight  · Stomach cramps  · Tiredness  · Vaginal spotting or light bleeding, itching, or discharge  · Weight changes  If you notice other side effects that you think are caused by this medicine, tell your doctor  Call your doctor for medical advice about side effects  You may report side effects to FDA at 4-982-FDA-9847  © 2017 [address] Information is for End User's use only and may not be sold, redistributed or otherwise used for commercial purposes  The above information is an  only  It is not intended as medical advice for individual conditions or treatments  Talk to your doctor, nurse or pharmacist before following any medical regimen to see if it is safe and effective for you

## 2020-07-30 LAB
HPV HR 12 DNA CVX QL NAA+PROBE: NEGATIVE
HPV16 DNA CVX QL NAA+PROBE: NEGATIVE
HPV18 DNA CVX QL NAA+PROBE: NEGATIVE

## 2020-07-31 LAB
C TRACH DNA SPEC QL NAA+PROBE: NEGATIVE
N GONORRHOEA DNA SPEC QL NAA+PROBE: NEGATIVE

## 2020-08-05 LAB
LAB AP GYN PRIMARY INTERPRETATION: ABNORMAL
Lab: ABNORMAL
PATH INTERP SPEC-IMP: ABNORMAL

## 2020-09-10 ENCOUNTER — APPOINTMENT (OUTPATIENT)
Dept: LAB | Facility: HOSPITAL | Age: 40
End: 2020-09-10
Payer: COMMERCIAL

## 2020-09-10 DIAGNOSIS — Z11.3 ROUTINE SCREENING FOR STI (SEXUALLY TRANSMITTED INFECTION): ICD-10-CM

## 2020-09-10 LAB — HBV SURFACE AG SER QL: NORMAL

## 2020-09-10 PROCEDURE — 87340 HEPATITIS B SURFACE AG IA: CPT

## 2020-09-10 PROCEDURE — 87389 HIV-1 AG W/HIV-1&-2 AB AG IA: CPT

## 2020-09-10 PROCEDURE — 86592 SYPHILIS TEST NON-TREP QUAL: CPT

## 2020-09-10 PROCEDURE — 36415 COLL VENOUS BLD VENIPUNCTURE: CPT

## 2020-09-11 LAB — RPR SER QL: NORMAL

## 2020-09-12 LAB — HIV 1+2 AB+HIV1 P24 AG SERPL QL IA: NORMAL

## 2020-11-08 ENCOUNTER — OFFICE VISIT (OUTPATIENT)
Dept: URGENT CARE | Age: 40
End: 2020-11-08
Payer: COMMERCIAL

## 2020-11-08 VITALS — RESPIRATION RATE: 18 BRPM | OXYGEN SATURATION: 98 % | HEART RATE: 65 BPM | TEMPERATURE: 97.8 F

## 2020-11-08 DIAGNOSIS — Z20.822 EXPOSURE TO COVID-19 VIRUS: Primary | ICD-10-CM

## 2020-11-08 PROCEDURE — 99212 OFFICE O/P EST SF 10 MIN: CPT | Performed by: PHYSICIAN ASSISTANT

## 2020-11-08 PROCEDURE — U0003 INFECTIOUS AGENT DETECTION BY NUCLEIC ACID (DNA OR RNA); SEVERE ACUTE RESPIRATORY SYNDROME CORONAVIRUS 2 (SARS-COV-2) (CORONAVIRUS DISEASE [COVID-19]), AMPLIFIED PROBE TECHNIQUE, MAKING USE OF HIGH THROUGHPUT TECHNOLOGIES AS DESCRIBED BY CMS-2020-01-R: HCPCS | Performed by: PHYSICIAN ASSISTANT

## 2020-11-09 ENCOUNTER — TELEPHONE (OUTPATIENT)
Dept: URGENT CARE | Age: 40
End: 2020-11-09

## 2020-11-09 LAB — SARS-COV-2 RNA SPEC QL NAA+PROBE: DETECTED

## 2021-01-18 ENCOUNTER — HOSPITAL ENCOUNTER (OUTPATIENT)
Dept: ULTRASOUND IMAGING | Facility: CLINIC | Age: 41
Discharge: HOME | End: 2021-01-18
Payer: COMMERCIAL

## 2021-01-18 ENCOUNTER — HOSPITAL ENCOUNTER (OUTPATIENT)
Dept: MAMMOGRAPHY | Facility: CLINIC | Age: 41
Discharge: HOME | End: 2021-01-18
Payer: COMMERCIAL

## 2021-01-18 VITALS — BODY MASS INDEX: 22.49 KG/M2 | HEIGHT: 65 IN | WEIGHT: 135 LBS

## 2021-01-18 DIAGNOSIS — N63.20 LEFT BREAST MASS: ICD-10-CM

## 2021-01-18 PROCEDURE — 76642 ULTRASOUND BREAST LIMITED: CPT

## 2021-01-18 PROCEDURE — G0279 TOMOSYNTHESIS, MAMMO: HCPCS

## 2021-01-18 PROCEDURE — 77066 DX MAMMO INCL CAD BI: CPT

## 2021-01-19 DIAGNOSIS — R92.8 ABNORMAL FINDING ON MAMMOGRAPHY: Primary | ICD-10-CM

## 2021-01-20 PROBLEM — R92.8 ABNORMAL MAMMOGRAM: Status: ACTIVE | Noted: 2021-01-20

## 2021-01-20 NOTE — PATIENT INSTRUCTIONS
Mammogram   GENERAL INFORMATION:   What is a mammogram?  A mammogram is an x-ray of your breasts to screen for breast cancer  Who should have a mammogram?  You should have a mammogram if you felt a lump or noticed other changes during a breast self-exam  Talk to your caregiver about when you should start having mammograms  How do I get ready for a mammogram?   · Do not use deodorant, powder, lotion, or perfume  These products may cause particles to appear on your mammogram      · Wear a 2-piece outfit  · If you are breastfeeding, express as much milk as possible before the mammogram     · Bring a list of the dates and places of your past mammograms and other breast tests or treatments  · If your breasts are tender before your monthly period, do not have a mammogram during this time  Schedule your mammogram to be done 1 week after your period ends  How is a mammogram done? A top view and a side view x-ray are usually done for each breast  Tell caregivers if you have breast implants or breast problems before you have your mammogram  You may need extra x-rays of each breast   · You will be given a hospital gown  Take off your clothes from the waist up  Wear the hospital gown so that it opens in the front  · You will sit or stand next to a small x-ray machine  The caregiver will help you place one of your breasts on the x-ray plate  Your arm and breast will be moved until your breast is in the correct position  · Your breast will be gently pressed between 2 plastic plates for a few seconds while the x-ray is taken  This may be uncomfortable  · You will be asked to hold your breath while the x-ray is taken  Another x-ray will be taken of the same breast after the position of the x-ray machine has been changed  · Your other breast will be x-rayed the same way    What happens after my mammogram?  Your breasts may feel tender for a short while after the mammogram  You may resume your regular activities  Ask your caregiver when you should receive the results of your mammogram   What are the risks of a mammogram?  You will be exposed to a small amount of radiation  Some breast cancers may not show up on mammograms  When should I contact my caregiver? Contact your caregiver if:  · You cannot make your appointment on time  · You do not receive your results when expected  · You have questions or concerns about the mammogram   CARE AGREEMENT:   You have the right to help plan your care  Learn about your health condition and how it may be treated  Discuss treatment options with your caregivers to decide what care you want to receive  You always have the right to refuse treatment  The above information is an  only  It is not intended as medical advice for individual conditions or treatments  Talk to your doctor, nurse or pharmacist before following any medical regimen to see if it is safe and effective for you  © 2014 [address] is for End User's use only and may not be sold, redistributed or otherwise used for commercial purposes  All illustrations and images included in CareNotes® are the copyrighted property of [employer]  or [employer]  Breast Self Exam for Women   WHAT YOU NEED TO KNOW:   What is a breast self-exam (BSE)? A BSE is a way to check your breasts for lumps and other changes  Regular BSEs can help you know how your breasts normally look and feel  Most breast lumps or changes are not cancer, but you should always have them checked by a healthcare provider  Your healthcare provider can also watch you do a BSE and can tell you if you are doing your BSE correctly  Why should I do a BSE? Breast cancer is the most common type of cancer in women  Even if you have mammograms, you may still want to do a BSE regularly   If you know how your breasts normally feel and look, it may help you know when to contact your healthcare provider  Mammograms can miss some cancers  You may find a lump during a BSE that did not show up on a mammogram   When should I do a BSE? If you have periods, you may want to do your BSE 1 week after your period ends  This is the time when your breasts may be the least swollen, lumpy, or tender  You can do regular BSEs even if you are breastfeeding or have breast implants  How should I do a BSE? · Look at your breasts in a mirror  Look at the size and shape of each breast and nipple  Check for swelling, lumps, dimpling, scaly skin, or other skin changes  Look for nipple changes, such as a nipple that is painful or beginning to pull inward  Gently squeeze both nipples and check to see if fluid (that is not breast milk) comes out of them  If you find any of these or other breast changes, contact your healthcare provider  Check your breasts while you sit or  the following 3 positions:    ? Hang your arms down at your sides  ? Raise your hands and join them behind your head  ? Put firm pressure with your hands on your hips  Bend slightly forward while you look at your breasts in the mirror  · Lie down and feel your breasts  When you lie down, your breast tissue spreads out evenly over your chest  This makes it easier for you to feel for lumps and anything that may not be normal for your breasts  Do a BSE on one breast at a time  ? Place a small pillow or towel under your left shoulder  Put your left arm behind your head  ? Use the 3 middle fingers of your right hand  Use your fingertip pads, on the top of your fingers  Your fingertip pad is the most sensitive part of your finger  ? Use small circles to feel your breast tissue  Use your fingertip pads to make dime-sized, overlapping circles on your breast and armpits  Use light, medium, and firm pressure  First, press lightly   Second, press with medium pressure to feel a little deeper into the breast  Last, use firm pressure to feel deep within your breast     ? Examine your entire breast area  Examine the breast area from above the breast to below the breast where you feel only ribs  Make small circles with your fingertips, starting in the middle of your armpit  Make circles going up and down the breast area  Continue toward your breast and all the way across it  Examine the area from your armpit all the way over to the middle of your chest (breastbone)  Stop at the middle of your chest     ? Move the pillow or towel to your right shoulder, and put your right arm behind your head  Use the 3 fingertip pads of your left hand, and repeat the above steps to do a BSE on your right breast   What else can I do to check for breast problems or cancer? Talk to your healthcare provider about mammograms  A mammogram is an x-ray of your breasts to screen for breast cancer or other problems  Your provider can tell you the benefits and risks of mammograms  The first mammogram is usually at age 39 or 48  Your provider may recommend you start at 36 or younger if your risk for breast cancer is high  Mammograms usually continue every 1 to 2 years until age 76  When should I call my doctor? · You find any lumps or changes in your breasts  · You have breast pain or fluid coming from your nipples  · You have questions or concerns or concerns about your condition or care  CARE AGREEMENT:   You have the right to help plan your care  Learn about your health condition and how it may be treated  Discuss treatment options with your healthcare providers to decide what care you want to receive  You always have the right to refuse treatment  The above information is an  only  It is not intended as medical advice for individual conditions or treatments  Talk to your doctor, nurse or pharmacist before following any medical regimen to see if it is safe and effective for you    © Copyright [employer] 2020 Information is for End User's use only and may not be sold, redistributed or otherwise used for commercial purposes   All illustrations and images included in CareNotes® are the copyrighted property of A D A M , Inc  or [facility] Haja Abraham

## 2021-01-20 NOTE — PROGRESS NOTES
Assessment/Plan:      Diagnoses and all orders for this visit:    Abnormal mammogram    HSV infection  -     valACYclovir (VALTREX) 500 mg tablet; Take 1 tablet (500 mg total) by mouth 2 (two) times a day as needed (HSV outbreak) for up to 3 days    Cysts of both ovaries  -     US pelvis complete non OB; Future    Other orders  -     Cancel: Ambulatory referral to Internal Medicine; Future          - reviewed current recommendation for repeat mammogram and bilateral US in 6 months  Reviewed with patient and reassured patient cysts are typically benign  Written information provided  Reviewed with patient if cysts are increasing in size or are painful had refer to breast specialist to discuss options for treatment  -pelvic ultrasound ordered  Reviewed with patient to stop OCPs for at least 2-3 weeks prior to getting imaging  Will call with results  -signs and symptoms report reviewed      RTO 2021 for annual exam     Subjective:     Patient ID: Janene Louise is a 36 y o  female  HPI   here to discuss recent mammogram  Mammogram 21 resulted BiRads 3 with recommendation for follow up mammogram and bilateral US in 6 months  Patient has always had abnormal mammograms  Denies family history of breast cancer  Denies palpable masses, nipple discharge and visual changes  Patient is requesting pelvic ultrasound  States she has a history of endometriosis and ovarian cysts  Would like to stop OCPs  Has been has vasectomy for contraception  FINDINGS:   LEFT  Mammo diagnostic w 3d & cad: There are no suspicious masses, grouped microcalcifications or areas of architectural distortion  1) CYST  US breast limited (diagnostic):  Again seen at 3 o'clock, retroareolar region is an oval well-circumscribed hypoechoic solid mass or complicated cyst   This measures 5 x 5 x 4 mm, unchanged compared to the 2 prior ultrasounds    2) CYST  US breast limited (diagnostic):  Again seen at 3 o'clock, 2 cm from the nipple is an oval, well-circumscribed, mildly lobulated hypoechoic solid mass or complicated cyst   This measures 8 x 6 x 4 mm, unchanged compared to the prior ultrasound  This is slightly increased compared to the initial ultrasound where it measured 6 x 6 x 3 mm  RIGHT  3) ASYMMETRY  Mammo diagnostic bilateral w 3d & cad: There is an asymmetry seen in the lower region of the right breast in the middle depth on the MLO (slice 11) view  There is no definite correlate on the CC view  Based on tomosynthesis this should be in the lower central breast   US breast limited (diagnostic): The lower central breast was evaluated from 5:00 to 7 o'clock no sonographic abnormality was seen  The mammographic asymmetry is probably benign  Six-month follow-up mammogram is recommended  IMPRESSION:  Left breast:  2 small complicated cysts are probably benign  Six-month follow-up ultrasound is recommended  Right breast:  Mammographic asymmetry is probably benign  Six-month follow-up mammogram is recommended      ASSESSMENT/BI-RADS CATEGORY:  Left: 3 - Probably Benign  Right: 3 - Probably Benign  Overall: 3 - Probably Benign     RECOMMENDATION:       - Diagnostic mammogram in 6 months for the right breast        - Ultrasound in 6 months for the left breast        Last pap smear 7/29/20 resulted ASCUS/ HR HPV negative  Last mammogram 1/18/21 resulted BiRads 3 with recommendation for follow up in 6 months - mammogram and bilateral US    Review of Systems   Constitutional: Negative for chills, fatigue and fever  Respiratory: Negative  Cardiovascular: Negative  Genitourinary: Negative  Neurological: Negative for headaches  Objective:     Physical Exam  Vitals signs reviewed  Constitutional:       Appearance: Normal appearance  Cardiovascular:      Rate and Rhythm: Normal rate and regular rhythm  Pulmonary:      Effort: Pulmonary effort is normal       Breath sounds: Normal breath sounds  Neurological:      Mental Status: She is alert and oriented to person, place, and time     Psychiatric:         Mood and Affect: Mood normal          Behavior: Behavior normal

## 2021-01-21 ENCOUNTER — PATIENT OUTREACH (OUTPATIENT)
Dept: INTERNAL MEDICINE CLINIC | Facility: CLINIC | Age: 41
End: 2021-01-21

## 2021-01-21 ENCOUNTER — TELEPHONE (OUTPATIENT)
Dept: PSYCHIATRY | Facility: CLINIC | Age: 41
End: 2021-01-21

## 2021-01-21 ENCOUNTER — OFFICE VISIT (OUTPATIENT)
Dept: OBGYN CLINIC | Facility: CLINIC | Age: 41
End: 2021-01-21

## 2021-01-21 ENCOUNTER — OFFICE VISIT (OUTPATIENT)
Dept: INTERNAL MEDICINE CLINIC | Facility: CLINIC | Age: 41
End: 2021-01-21

## 2021-01-21 ENCOUNTER — HOSPITAL ENCOUNTER (OUTPATIENT)
Dept: RADIOLOGY | Facility: HOSPITAL | Age: 41
Discharge: HOME | End: 2021-01-21
Payer: COMMERCIAL

## 2021-01-21 ENCOUNTER — TRANSCRIBE ORDERS (OUTPATIENT)
Dept: RADIOLOGY | Facility: HOSPITAL | Age: 41
End: 2021-01-21

## 2021-01-21 VITALS
HEIGHT: 65 IN | DIASTOLIC BLOOD PRESSURE: 84 MMHG | SYSTOLIC BLOOD PRESSURE: 136 MMHG | HEART RATE: 86 BPM | WEIGHT: 140 LBS | BODY MASS INDEX: 23.32 KG/M2

## 2021-01-21 VITALS
HEIGHT: 65 IN | BODY MASS INDEX: 23.32 KG/M2 | DIASTOLIC BLOOD PRESSURE: 60 MMHG | WEIGHT: 140 LBS | HEART RATE: 70 BPM | SYSTOLIC BLOOD PRESSURE: 132 MMHG | TEMPERATURE: 98.3 F

## 2021-01-21 DIAGNOSIS — Z23 NEED FOR INFLUENZA VACCINATION: ICD-10-CM

## 2021-01-21 DIAGNOSIS — Z13.220 SCREENING FOR LIPID DISORDERS: ICD-10-CM

## 2021-01-21 DIAGNOSIS — R92.8 ABNORMAL MAMMOGRAM: ICD-10-CM

## 2021-01-21 DIAGNOSIS — F32.9 REACTIVE DEPRESSION: Primary | ICD-10-CM

## 2021-01-21 DIAGNOSIS — N83.202 CYSTS OF BOTH OVARIES: ICD-10-CM

## 2021-01-21 DIAGNOSIS — R07.81 RIB PAIN ON RIGHT SIDE: ICD-10-CM

## 2021-01-21 DIAGNOSIS — N83.201 CYSTS OF BOTH OVARIES: ICD-10-CM

## 2021-01-21 DIAGNOSIS — R92.8 ABNORMAL MAMMOGRAM: Primary | ICD-10-CM

## 2021-01-21 DIAGNOSIS — R07.81 RIB PAIN ON RIGHT SIDE: Primary | ICD-10-CM

## 2021-01-21 DIAGNOSIS — Z13.31 POSITIVE DEPRESSION SCREENING: ICD-10-CM

## 2021-01-21 DIAGNOSIS — B00.9 HSV INFECTION: ICD-10-CM

## 2021-01-21 DIAGNOSIS — F32.9 REACTIVE DEPRESSION: ICD-10-CM

## 2021-01-21 DIAGNOSIS — Z23 NEED FOR TDAP VACCINATION: ICD-10-CM

## 2021-01-21 PROCEDURE — 90686 IIV4 VACC NO PRSV 0.5 ML IM: CPT | Performed by: PHYSICIAN ASSISTANT

## 2021-01-21 PROCEDURE — 1036F TOBACCO NON-USER: CPT | Performed by: NURSE PRACTITIONER

## 2021-01-21 PROCEDURE — 3725F SCREEN DEPRESSION PERFORMED: CPT | Performed by: PHYSICIAN ASSISTANT

## 2021-01-21 PROCEDURE — 90472 IMMUNIZATION ADMIN EACH ADD: CPT | Performed by: PHYSICIAN ASSISTANT

## 2021-01-21 PROCEDURE — 99203 OFFICE O/P NEW LOW 30 MIN: CPT | Performed by: PHYSICIAN ASSISTANT

## 2021-01-21 PROCEDURE — 90715 TDAP VACCINE 7 YRS/> IM: CPT | Performed by: PHYSICIAN ASSISTANT

## 2021-01-21 PROCEDURE — 99213 OFFICE O/P EST LOW 20 MIN: CPT | Performed by: NURSE PRACTITIONER

## 2021-01-21 PROCEDURE — 90471 IMMUNIZATION ADMIN: CPT | Performed by: PHYSICIAN ASSISTANT

## 2021-01-21 PROCEDURE — 3008F BODY MASS INDEX DOCD: CPT | Performed by: NURSE PRACTITIONER

## 2021-01-21 PROCEDURE — 71101 X-RAY EXAM UNILAT RIBS/CHEST: CPT

## 2021-01-21 RX ORDER — VALACYCLOVIR HYDROCHLORIDE 500 MG/1
500 TABLET, FILM COATED ORAL 2 TIMES DAILY PRN
Qty: 6 TABLET | Refills: 3 | Status: SHIPPED | OUTPATIENT
Start: 2021-01-21 | End: 2022-04-04 | Stop reason: SDUPTHER

## 2021-01-21 NOTE — PROGRESS NOTES
MARGARET has met with pt this date after PCP visit to offer [address] resources  Pt relates she has long standing anxiety which she has usually been able to manage but today also have symptoms of depression  Pt denies any SI/HI  She denies any previous [address]  for herself but relates her children (ages 14/son and 16/ dgt) are in [place] at [facility]  MARGARET has provided contact info for [address] [facility] location which is affiliated with [facility] but they have a several month waiting list    Pt is aware they offer walk in assessment  M-F 8 AM to 4:30 PM     MARGARTE has also provided info for [facility] [address] [phone] and has offered to make an internal referral sn pace an in basket request to them which pt agrees to referral       Tiburcio Pizarro has also provided info re [facility] [phone]  Support and encouragement provided  Pt relates she will f/u and call to schedule an appointment and will f/u with MARGARET if needed

## 2021-01-21 NOTE — TELEPHONE ENCOUNTER
----- Message from MARGARET Granger sent at 1/21/2021 11:50 AM EST -----  Regarding: Can you accept this pt? Can you accept our pt for psychiatry and therapy? She has [employer]  Her first language is [place] but she can communicate well in [place] as well  Thanks for your help     [facility]

## 2021-01-21 NOTE — PROGRESS NOTES
Assessment/Plan: We are starting your care today  Script provided to get the imaging of your right ribs  We did review it does not appear that they are fractured but you are having significant pain after the lifting  We did review that you note that heat does help the pain on your right rib area  You may continue to use the topical and the heat as well  No appointment needed  Script provided to get screening labs  As per our discussion referral to breast Clinic as you would like to speak with the specialist regarding the abnormalities on your recent diagnostic mammogram and ultrasounds  We also reviewed your symptoms of depression and stress and you would like to get established with a therapist of your own  Referral to  provided  Flu and tetanus vaccines updated today    We will contact you with the results of your x-rays and lab results  No problem-specific Assessment & Plan notes found for this encounter  Diagnoses and all orders for this visit:    Rib pain on right side  -     Cancel: XR ribs 2 vw right; Future    Abnormal mammogram  -     Ambulatory referral to Breast Clinic; Future    Reactive depression  -     Ambulatory referral to social work care management program; Future    Screening for lipid disorders  -     Comprehensive metabolic panel  -     Lipid Panel with Direct LDL reflex    Positive depression screening    Need for influenza vaccination  -     influenza vaccine, quadrivalent, 0 5 mL, preservative-free, for adult and pediatric patients 6 mos+ (AFLURIA, FLUARIX, FLULAVAL, FLUZONE)    Need for Tdap vaccination  -     TDAP VACCINE GREATER THAN OR EQUAL TO 6YO IM          Subjective:      Patient ID: Darleen Salmon is a 36 y o  female      New patient to establish care    Following with OB had mammogram with bilateral abnormalities is to have f/u in 6 months states is concerned so scheduled follow up with her OB today    Is wondering if needs to have biopsy or other eval  No known FH breast cancer      Meds multivitamin  On BCP from [address] for many years history of endometriosis    Patient denies any other history of chronic medical conditions  When got to discussion about possible history of any mental health conditions as part of new patient she does become visibly tearful  Patient states while she has never been under formal treatment for any depression she has had symptoms for a while now  States children under treatment for anxiety and depression, through [facility]  States under stress and worried about her children States has been speaking with her daughters therapist but does feel needs her own sessions  Patient does become tearful during discussion regarding concern over her children's health  Would like to establish therapist on her own  Discussed with patient and she is agreeable to speaking with our  for assistance in establishing mental health services for therapist       Here today regarding pain R lower rib margin, states does power lifting and uses a belt and when squatting weight belt went up under R ribs  Patient does admit that the hot water from the shower does help  Has been using icy hot patches and Motrin with some relief but does note they heat provides most of improvement  Patient does note pain increases with movement and with deep inspiration  Patient did not feel any sudden popping or cracking sound but pain started immediately after the heavy weight belt while lifting a heavy weight got jammed underneath her ribs  Nothing on exam is indicative of definitive rib fracture based on mechanism of injury more likely rib contusion or bruise  Reassurance but will get imaging secondary to ongoing significant pain                      The following portions of the patient's history were reviewed and updated as appropriate: allergies, current medications, past family history, past medical history, past social history, past surgical history and problem list     Review of Systems   Constitutional: Negative for chills, fever and unexpected weight change  HENT: Negative  Eyes: Negative  Respiratory: Negative  Negative for cough and choking  Cardiovascular: Negative  Negative for chest pain and palpitations  Endocrine: Negative  Negative for polydipsia, polyphagia and polyuria  Genitourinary: Positive for pelvic pain  Musculoskeletal: Positive for arthralgias and myalgias  No fractures   Skin: Negative  Neurological: Negative for dizziness, seizures, numbness and headaches  Psychiatric/Behavioral: Positive for dysphoric mood  The patient is nervous/anxious  Objective:      /60 (BP Location: Left arm, Patient Position: Sitting, Cuff Size: Adult)   Pulse 70   Temp 98 3 °F (36 8 °C) (Temporal)   Ht 5' 5" (1 651 m)   Wt 63 5 kg (140 lb)   BMI 23 30 kg/m²          Physical Exam  Vitals signs and nursing note reviewed  Constitutional:       General: She is not in acute distress  Appearance: Normal appearance  She is normal weight  HENT:      Head: Normocephalic  Mouth/Throat:      Mouth: Mucous membranes are moist       Pharynx: Oropharynx is clear  Eyes:      Conjunctiva/sclera: Conjunctivae normal       Pupils: Pupils are equal, round, and reactive to light  Cardiovascular:      Rate and Rhythm: Normal rate and regular rhythm  Heart sounds: Normal heart sounds  Pulmonary:      Effort: Pulmonary effort is normal  No respiratory distress  Breath sounds: Normal breath sounds  No wheezing  Abdominal:      General: Bowel sounds are normal       Palpations: Abdomen is soft  There is no mass  Tenderness: There is abdominal tenderness  There is no guarding  Musculoskeletal:         General: Tenderness and signs of injury present  No swelling or deformity  Comments: On inspection no visual deformities noted to right or left ribs    No bruising or erythema in area of concern  Patient however does have point tenderness without abnormal rib movement to right anterior 11th rib  She does have some mild discomfort costochondral E but primarily directly over palpation of 11th rib  Skin:     General: Skin is warm and dry  Findings: No bruising, erythema or rash  Neurological:      General: No focal deficit present  Mental Status: She is alert  Psychiatric:         Attention and Perception: Attention normal          Mood and Affect: Affect is tearful  Speech: Speech normal          Behavior: Behavior normal          Thought Content: Thought content normal          Cognition and Memory: Cognition normal          Depression Screening Follow-up Plan: Patient's depression screening was positive with a PHQ-2 score of   Their PHQ-9 score was   Patient assessed for underlying major depression  They have no active suicidal ideations  Brief counseling provided and recommend additional follow-up/re-evaluation next office visit    for assistance getting in with therapist

## 2021-01-23 NOTE — PATIENT INSTRUCTIONS
We are starting your care today  Script provided to get the imaging of your right ribs  We did review it does not appear that they are fractured but you are having significant pain after the lifting  We did review that you note that heat does help the pain on your right rib area  You may continue to use the topical and the heat as well  No appointment needed  Script provided to get screening labs  As per our discussion referral to breast Clinic as you would like to speak with the specialist regarding the abnormalities on your recent diagnostic mammogram and ultrasounds  We also reviewed your symptoms of depression and stress and you would like to get established with a therapist of your own  Referral to  provided  Flu and tetanus vaccines updated today    We will contact you with the results of your x-rays and lab results  No

## 2021-02-14 ENCOUNTER — LAB (OUTPATIENT)
Dept: LAB | Age: 41
End: 2021-02-14
Payer: COMMERCIAL

## 2021-02-14 LAB
ALBUMIN SERPL BCP-MCNC: 3.5 G/DL (ref 3.5–5)
ALP SERPL-CCNC: 67 U/L (ref 46–116)
ALT SERPL W P-5'-P-CCNC: 32 U/L (ref 12–78)
ANION GAP SERPL CALCULATED.3IONS-SCNC: 3 MMOL/L (ref 4–13)
AST SERPL W P-5'-P-CCNC: 22 U/L (ref 5–45)
BILIRUB SERPL-MCNC: 0.48 MG/DL (ref 0.2–1)
BUN SERPL-MCNC: 14 MG/DL (ref 5–25)
CALCIUM SERPL-MCNC: 8.9 MG/DL (ref 8.3–10.1)
CHLORIDE SERPL-SCNC: 108 MMOL/L (ref 100–108)
CHOLEST SERPL-MCNC: 183 MG/DL (ref 50–200)
CO2 SERPL-SCNC: 29 MMOL/L (ref 21–32)
CREAT SERPL-MCNC: 0.77 MG/DL (ref 0.6–1.3)
GFR SERPL CREATININE-BSD FRML MDRD: 97 ML/MIN/1.73SQ M
GLUCOSE P FAST SERPL-MCNC: 84 MG/DL (ref 65–99)
HDLC SERPL-MCNC: 95 MG/DL
LDLC SERPL CALC-MCNC: 75 MG/DL (ref 0–100)
POTASSIUM SERPL-SCNC: 4 MMOL/L (ref 3.5–5.3)
PROT SERPL-MCNC: 6.8 G/DL (ref 6.4–8.2)
SODIUM SERPL-SCNC: 140 MMOL/L (ref 136–145)
TRIGL SERPL-MCNC: 63 MG/DL

## 2021-02-14 PROCEDURE — 80061 LIPID PANEL: CPT | Performed by: PHYSICIAN ASSISTANT

## 2021-02-14 PROCEDURE — 80053 COMPREHEN METABOLIC PANEL: CPT | Performed by: PHYSICIAN ASSISTANT

## 2021-02-14 PROCEDURE — 36415 COLL VENOUS BLD VENIPUNCTURE: CPT | Performed by: PHYSICIAN ASSISTANT

## 2021-03-03 ENCOUNTER — CONSULT (OUTPATIENT)
Dept: SURGERY | Facility: CLINIC | Age: 41
End: 2021-03-03
Payer: COMMERCIAL

## 2021-03-03 VITALS
HEIGHT: 65 IN | TEMPERATURE: 97.5 F | DIASTOLIC BLOOD PRESSURE: 78 MMHG | WEIGHT: 144 LBS | BODY MASS INDEX: 23.99 KG/M2 | HEART RATE: 69 BPM | SYSTOLIC BLOOD PRESSURE: 118 MMHG

## 2021-03-03 DIAGNOSIS — R92.8 ABNORMAL MAMMOGRAM: ICD-10-CM

## 2021-03-03 PROCEDURE — 3008F BODY MASS INDEX DOCD: CPT | Performed by: NURSE PRACTITIONER

## 2021-03-03 PROCEDURE — 1036F TOBACCO NON-USER: CPT | Performed by: SURGERY

## 2021-03-03 PROCEDURE — 99204 OFFICE O/P NEW MOD 45 MIN: CPT | Performed by: SURGERY

## 2021-03-03 PROCEDURE — 3008F BODY MASS INDEX DOCD: CPT | Performed by: SURGERY

## 2021-03-03 NOTE — ASSESSMENT & PLAN NOTE
Mammogram and ultrasound are reviewed and there is no over worrisome findings  I explained this to the patient  Her physical am exam has no worrisome findings either  I explained to her that the mammogram abnormality needs to be followed up with repeat mammogram in 6 months time  I tried to reassure that there is no evidence a cancer at this point time  She was very concerned about this and somewhat tearful  I think I explained to her that everything is fine at this point time and would most likely be fine without cancer  Will repeat the mammogram per Radiology recommendations and see her back in 6 months time

## 2021-03-03 NOTE — PROGRESS NOTES
Office Visit - General Surgery  Erwin Bradley MRN: [id number]  Encounter: [id number]    Assessment and Plan    Problem List Items Addressed This Visit        Other    Abnormal mammogram     Mammogram and ultrasound are reviewed and there is no over worrisome findings  I explained this to the patient  Her physical am exam has no worrisome findings either  I explained to her that the mammogram abnormality needs to be followed up with repeat mammogram in 6 months time  I tried to reassure that there is no evidence a cancer at this point time  She was very concerned about this and somewhat tearful  I think I explained to her that everything is fine at this point time and would most likely be fine without cancer  Will repeat the mammogram per Radiology recommendations and see her back in 6 months time  Chief Complaint:  Erwin Bradley is a 36 y o  female who presents for Breast Complaint (Consult cysts in b/l breasts )    Subjective  3year-old female who comes in for breast evaluation  She recent mammogram showed an abnormal finding  As far as breast complaints go, she has had a little tenderness around the time of appear in periods, left greater than right  Pre  A 14 last   Two years ago 1st live birth 22  2 para 2 she breast fed all her children she has been on hormone therapy for endometriosis  There is no family history of breast cancer    She has never had any biopsies or breast surgery in the past   pregnancy    Past Medical History  Past Medical History:   Diagnosis Date    Endometriosis        Past Surgical History  Past Surgical History:   Procedure Laterality Date     SECTION      , 2006    HYSTERECTOMY N/A 2016    Procedure: EXCISION  SCAR ENDOMETRIOSIS painful nodule;  Surgeon: Stephanie Jacob MD;  [facility];  Service:        Family History  Family History   Problem Relation Age of Onset    No Known Problems Mother    Eder Rodas No Known Problems Father     No Known Problems Sister     No Known Problems Daughter     No Known Problems Maternal Grandmother     No Known Problems Maternal Grandfather     No Known Problems Paternal Grandmother     No Known Problems Paternal Grandfather     No Known Problems Son     Endometrial cancer Maternal Aunt     Cancer Maternal Aunt     No Known Problems Paternal Aunt     Cancer Paternal Aunt         type unknown       Social History  Social History     Socioeconomic History    Marital status: /Civil Union     Spouse name: None    Number of children: 2    Years of education: None    Highest education level: None   Occupational History    Occupation: cleaning    Social Needs    Financial resource strain: Not hard at all   [email or web address] insecurity     Worry: Never true     Inability: Never true    Transportation needs     Medical: No     Non-medical: No   Tobacco Use    Smoking status: Never Smoker    Smokeless tobacco: Never Used   Substance and Sexual Activity    Alcohol use: No    Drug use: No    Sexual activity: Yes     Partners: Male     Birth control/protection: OCP   Lifestyle    Physical activity     Days per week: 6 days     Minutes per session: 90 min    Stress:  Only a little   Relationships    Social connections     Talks on phone: More than three times a week     Gets together: More than three times a week     Attends Yarsanism service: More than 4 times per year     Active member of club or organization: No     Attends meetings of clubs or organizations: Never     Relationship status:     Intimate partner violence     Fear of current or ex partner: No     Emotionally abused: No     Physically abused: No     Forced sexual activity: No   Other Topics Concern    None   Social History Narrative    None        Medications  Current Outpatient Medications on File Prior to Visit   Medication Sig Dispense Refill    Multiple Vitamins-Minerals (MULTIVITAMIN WITH MINERALS) tablet Take 1 tablet by mouth daily      valACYclovir (VALTREX) 500 mg tablet Take 1 tablet (500 mg total) by mouth 2 (two) times a day as needed (HSV outbreak) for up to 3 days 6 tablet 3     No current facility-administered medications on file prior to visit  Allergies  No Known Allergies    Review of Systems   Constitutional: Negative for chills and fever  HENT: Negative for trouble swallowing and voice change  Eyes: Negative for pain and visual disturbance  Respiratory: Negative for cough and shortness of breath  Cardiovascular: Negative for chest pain and leg swelling  Gastrointestinal: Negative for abdominal pain, nausea and vomiting  Endocrine: Negative for cold intolerance, heat intolerance, polydipsia, polyphagia and polyuria  Genitourinary: Negative for difficulty urinating and flank pain  Musculoskeletal: Negative for arthralgias and gait problem  Skin: Negative for rash and wound  Allergic/Immunologic: Negative for food allergies  Neurological: Negative for seizures, syncope, weakness and headaches  Hematological: Negative for adenopathy  Psychiatric/Behavioral: Negative for confusion  All other systems reviewed and are negative  Objective  Vitals:    03/03/21 1352   BP: 118/78   Pulse: 69   Temp: 97 5 °F (36 4 °C)       Physical Exam  Vitals signs and nursing note reviewed  Constitutional:       General: She is not in acute distress  Appearance: She is well-developed  She is not diaphoretic  HENT:      Head: Normocephalic and atraumatic  Right Ear: External ear normal       Left Ear: External ear normal    Eyes:      General: No scleral icterus  Conjunctiva/sclera: Conjunctivae normal    Neck:      Musculoskeletal: Neck supple  Thyroid: No thyromegaly  Trachea: No tracheal deviation  Cardiovascular:      Rate and Rhythm: Normal rate and regular rhythm  Heart sounds: Normal heart sounds  No murmur  No friction rub  Pulmonary:      Effort: Pulmonary effort is normal  No respiratory distress  Breath sounds: Normal breath sounds  No wheezing or rales  Comments: Breast exam   Right breast:  No discrete masses no axillary adenopathy  Little firmness in the outer aspect of the breast     Left breast:  No discrete masses no axillary adenopathy, little more firm fibrous breast tissue the outer aspect of the breast   Abdominal:      General: There is no distension  Palpations: Abdomen is soft  There is no mass  Tenderness: There is no abdominal tenderness  There is no guarding or rebound  Musculoskeletal: Normal range of motion  Lymphadenopathy:      Cervical: No cervical adenopathy  Skin:     General: Skin is warm and dry  Neurological:      Mental Status: She is alert and oriented to person, place, and time  Psychiatric:         Behavior: Behavior normal          Thought Content:  Thought content normal          Judgment: Judgment normal

## 2021-03-04 ENCOUNTER — TELEPHONE (OUTPATIENT)
Dept: SURGERY | Facility: CLINIC | Age: 41
End: 2021-03-04

## 2021-04-12 ENCOUNTER — IMMUNIZATIONS (OUTPATIENT)
Dept: FAMILY MEDICINE CLINIC | Facility: HOSPITAL | Age: 41
End: 2021-04-12

## 2021-04-12 DIAGNOSIS — Z23 ENCOUNTER FOR IMMUNIZATION: Primary | ICD-10-CM

## 2021-04-12 PROCEDURE — 91300 SARS-COV-2 / COVID-19 MRNA VACCINE (PFIZER-BIONTECH) 30 MCG: CPT

## 2021-04-12 PROCEDURE — 0001A SARS-COV-2 / COVID-19 MRNA VACCINE (PFIZER-BIONTECH) 30 MCG: CPT

## 2021-05-06 ENCOUNTER — IMMUNIZATIONS (OUTPATIENT)
Dept: FAMILY MEDICINE CLINIC | Facility: HOSPITAL | Age: 41
End: 2021-05-06

## 2021-05-06 DIAGNOSIS — Z23 ENCOUNTER FOR IMMUNIZATION: Primary | ICD-10-CM

## 2021-05-06 PROCEDURE — 91300 SARS-COV-2 / COVID-19 MRNA VACCINE (PFIZER-BIONTECH) 30 MCG: CPT

## 2021-05-06 PROCEDURE — 0002A SARS-COV-2 / COVID-19 MRNA VACCINE (PFIZER-BIONTECH) 30 MCG: CPT

## 2021-07-22 ENCOUNTER — HOSPITAL ENCOUNTER (OUTPATIENT)
Dept: ULTRASOUND IMAGING | Facility: CLINIC | Age: 41
Discharge: HOME | End: 2021-07-22
Payer: COMMERCIAL

## 2021-07-22 ENCOUNTER — HOSPITAL ENCOUNTER (OUTPATIENT)
Dept: MAMMOGRAPHY | Facility: CLINIC | Age: 41
Discharge: HOME | End: 2021-07-22
Payer: COMMERCIAL

## 2021-07-22 VITALS — HEIGHT: 65 IN | BODY MASS INDEX: 23.99 KG/M2 | WEIGHT: 144 LBS

## 2021-07-22 DIAGNOSIS — R92.8 ABNORMAL FINDING ON MAMMOGRAPHY: ICD-10-CM

## 2021-07-22 PROCEDURE — 76642 ULTRASOUND BREAST LIMITED: CPT

## 2021-07-22 PROCEDURE — G0279 TOMOSYNTHESIS, MAMMO: HCPCS

## 2021-07-22 PROCEDURE — 77065 DX MAMMO INCL CAD UNI: CPT

## 2021-07-23 ENCOUNTER — TELEPHONE (OUTPATIENT)
Dept: OBGYN CLINIC | Facility: CLINIC | Age: 41
End: 2021-07-23

## 2021-07-23 NOTE — TELEPHONE ENCOUNTER
----- Message from Gildardo Bella sent at 7/23/2021  7:36 AM EDT -----  Please call pt to inform that she  needs a bilateral 3D diagnostic mammogram and left breast ultrasound due in 6 months,  due near 01/18/2022 -orders are in chart  Her imaging was not significantly changed     She is also due for her annual the end of July,   Thanks

## 2021-08-02 ENCOUNTER — ANNUAL EXAM (OUTPATIENT)
Dept: OBGYN CLINIC | Facility: CLINIC | Age: 41
End: 2021-08-02

## 2021-08-02 VITALS
HEART RATE: 67 BPM | SYSTOLIC BLOOD PRESSURE: 128 MMHG | BODY MASS INDEX: 23.82 KG/M2 | DIASTOLIC BLOOD PRESSURE: 70 MMHG | WEIGHT: 143 LBS | HEIGHT: 65 IN

## 2021-08-02 DIAGNOSIS — Z30.011 ENCOUNTER FOR INITIAL PRESCRIPTION OF CONTRACEPTIVE PILLS: Primary | ICD-10-CM

## 2021-08-02 DIAGNOSIS — Z01.419 ENCOUNTER FOR WELL WOMAN EXAM WITH ROUTINE GYNECOLOGICAL EXAM: ICD-10-CM

## 2021-08-02 PROCEDURE — 99214 OFFICE O/P EST MOD 30 MIN: CPT | Performed by: OBSTETRICS & GYNECOLOGY

## 2021-08-02 PROCEDURE — 0503F POSTPARTUM CARE VISIT: CPT | Performed by: OBSTETRICS & GYNECOLOGY

## 2021-08-02 RX ORDER — NORGESTIMATE AND ETHINYL ESTRADIOL 0.25-0.035
1 KIT ORAL DAILY
Qty: 64 TABLET | Refills: 1 | Status: SHIPPED | OUTPATIENT
Start: 2021-08-02

## 2021-08-02 NOTE — PROGRESS NOTES
Subjective     Rigoberto Beck is a 36 y o  female who presents for annual well woman exam  She has been taking OCPs from [place], since age 13  She uses it to control her cramps with menses  She has not been having any menses while on this OCP  Every few months, she takes a break from the [facility] and has a period then  She does not use this for birth control as her  has a vasectomy  However, this helps her control her pain and she prefers not having a period  We discussed Mirena as a good alternative for the patient, who is tired of having to take a daily medication  She would also like a refill on her acyclovir for her oral herpes  She denies  Having any lesions currently     GYN:  · Denies vaginal discharge, labial erythema or lesions, dyspareunia  · Menses do not occur while on OCP  She will occasionally take a break from her OCP every few months and will have her period then  · Contraception: vasectomy and OCP  · Patient is sexually active with one male partner  · Gynecologic surgery: none    OB:  · [id number] female  :  · Denies dysuria, urinary frequency or urgency  · Denies hematuria, flank pain, incontinence  Breast:  · Denies skin changes, dimpling, reddening, nipple retraction  · Pt reports that she ocasionally feels a mass bilaterally when standing, though sometimes she cannot feel any masses  · Denies breast discharge  · Patient does not have a family history of breast or ovarian ca  Her aunt had endometrial cancer and underwent a hysterectomy  General:  · Diet: healthy  · Exercise: power lift  · Work: cleaning  · ETOH use: no  · Tobacco use: no  · Recreational drug use: no    Screening:  · Cervical cancer: last pap smear 7/9/2020  Results were ASCUS with neg HPV  Next pap smear due 2023  · Breast cancer: Mammogram 1/18/21 resulted BiRads 3 with recommendation for follow up mammogram and bilateral US in 6 months  7/22/21 reveals same results  S/p surgery consult, pt reassured  Pt to schedule another mammogram in 6 months, already ordered  [place] surgery reassured pt  STD screening: declines  Review of Systems  Pertinent items are noted in HPI  Objective      /70   Pulse 67   Ht 5' 5" (1 651 m)   Wt 64 9 kg (143 lb)   LMP  (LMP Unknown)   BMI 23 80 kg/m²     Physical Exam  Exam conducted with a chaperone present  HENT:      Head: Normocephalic and atraumatic  Eyes:      Extraocular Movements: Extraocular movements intact  Neck:      Thyroid: No thyroid mass or thyroid tenderness  Cardiovascular:      Rate and Rhythm: Normal rate and regular rhythm  Pulmonary:      Effort: Pulmonary effort is normal       Breath sounds: Normal breath sounds  Chest:      Breasts:         Right: Normal          Left: Normal    Abdominal:      General: There is no distension  Palpations: Abdomen is soft  Tenderness: There is no abdominal tenderness  There is no guarding  Genitourinary:     Exam position: Lithotomy position  Labia:         Right: No rash or lesion  Left: No rash or lesion  Vagina: Normal       Cervix: Normal       Uterus: Normal        Adnexa: Right adnexa normal and left adnexa normal    Musculoskeletal:         General: Normal range of motion  Skin:     General: Skin is warm  Neurological:      Mental Status: She is alert and oriented to person, place, and time  Psychiatric:         Mood and Affect: Mood normal          Behavior: Behavior normal                  Assessment     London Credit is a 36 y o  [id number] with normal gynecologic exam      Plan   1  Routine well woman exam done today  2   Pap and HPV:Pap with HPV was not done today  Current ASCCP Guidelines reviewed  3   Mammogram previously ordered  Recommend pt to schedule her mammogram soon as the wait can be long  4   The patient declined STD testing  5  The patient is sexually active  She accepted contraception and options have been discussed   Refill sent for OCP, pamphlet given on Mirena, pt is considering switching to Mirena  6  Oral herpes, refill sent for acyclovir  7  The following were reviewed in today's visit: breast self exam, STD testing, use and side effects of OCPs, exercise and healthy diet    8  Patient to return to office in 12 months for next annual

## 2021-08-02 NOTE — PATIENT INSTRUCTIONS

## 2021-09-08 ENCOUNTER — OFFICE VISIT (OUTPATIENT)
Dept: SURGERY | Facility: CLINIC | Age: 41
End: 2021-09-08
Payer: COMMERCIAL

## 2021-09-08 VITALS — WEIGHT: 141.6 LBS | BODY MASS INDEX: 23.59 KG/M2 | HEIGHT: 65 IN | TEMPERATURE: 98 F

## 2021-09-08 DIAGNOSIS — R92.8 ABNORMAL MAMMOGRAM: Primary | ICD-10-CM

## 2021-09-08 PROCEDURE — 1036F TOBACCO NON-USER: CPT | Performed by: SURGERY

## 2021-09-08 PROCEDURE — 3008F BODY MASS INDEX DOCD: CPT | Performed by: SURGERY

## 2021-09-08 PROCEDURE — 99213 OFFICE O/P EST LOW 20 MIN: CPT | Performed by: SURGERY

## 2021-09-08 NOTE — PROGRESS NOTES
Office Visit - General Surgery  Miguelito Friedman MRN: [id number]  Encounter: [id number]    Assessment and Plan    Problem List Items Addressed This Visit        Other    Abnormal mammogram - Primary      Her breast exam feels stable to this point time  Reviewed mammogram and ultrasound which were also stable  I think she can feel in the routine screening and follow-up with OBGYN  Would be happy to see her back if any questions or problems  Chief Complaint:  Miguelito Friedman is a 39 y o  female who presents for Breast Complaint (6 month breast check)    Subjective   58-year-old female returns for breast check  She recently had six-month follow-up right mammogram and ultrasound on the left  Both were stable      Past Medical History  Past Medical History:   Diagnosis Date    Endometriosis        Past Surgical History  Past Surgical History:   Procedure Laterality Date     SECTION      , 2006    HYSTERECTOMY N/A 2016    Procedure: EXCISION  SCAR ENDOMETRIOSIS painful nodule;  Surgeon: Adriana Rodriguez MD;  Location: [facility];  Service:        Family History  Family History   Problem Relation Age of Onset    No Known Problems Mother     No Known Problems Father     No Known Problems Sister     No Known Problems Daughter     No Known Problems Maternal Grandmother     No Known Problems Maternal Grandfather     No Known Problems Paternal Grandmother     No Known Problems Paternal Grandfather     No Known Problems Son     Endometrial cancer Maternal Aunt     Cancer Maternal Aunt     No Known Problems Paternal Aunt     Cancer Paternal Aunt         type unknown    BRCA2 Positive Neg Hx     BRCA2 Negative Neg Hx     BRCA1 Positive Neg Hx     BRCA1 Negative Neg Hx     BRCA 1/2 Neg Hx     Ovarian cancer Neg Hx     Colon cancer Neg Hx     Breast cancer Neg Hx     Breast cancer additional onset Neg Hx        Social History  Social History Socioeconomic History    Marital status: /Civil Union     Spouse name: None    Number of children: 2    Years of education: None    Highest education level: None   Occupational History    Occupation: cleaning    Tobacco Use    Smoking status: Never Smoker    Smokeless tobacco: Never Used   Vaping Use    Vaping Use: Never used   Substance and Sexual Activity    Alcohol use: No    Drug use: No    Sexual activity: Yes     Partners: Male     Birth control/protection: OCP   Other Topics Concern    None   Social History Narrative    None     Social Determinants of Health     Financial Resource Strain: Low Risk     Difficulty of Paying Living Expenses: Not hard at all   Food Insecurity: No Food Insecurity    Worried About Running Out of Food in the Last Year: Never true    [address] in the Last Year: Never true   Transportation Needs:     Lack of Transportation (Medical):      Lack of Transportation (Non-Medical):    Physical Activity:     Days of Exercise per Week:     Minutes of Exercise per Session:    Stress:     Feeling of Stress :    Social Connections:     Frequency of Communication with Friends and Family:     Frequency of Social Gatherings with Friends and Family:     Attends Faith Services:     Active Member of Clubs or Organizations:     Attends Club or Organization Meetings:     Marital Status:    Intimate Partner Violence:     Fear of Current or Ex-Partner:     Emotionally Abused:     Physically Abused:     Sexually Abused:         Medications  Current Outpatient Medications on File Prior to Visit   Medication Sig Dispense Refill    Multiple Vitamins-Minerals (MULTIVITAMIN WITH MINERALS) tablet Take 1 tablet by mouth daily      norgestimate-ethinyl estradiol (ORTHO-CYCLEN) 0 25-35 MG-MCG per tablet Take 1 tablet by mouth daily 64 tablet 1    valACYclovir (VALTREX) 500 mg tablet Take 1 tablet (500 mg total) by mouth 2 (two) times a day as needed (HSV outbreak) for up to 3 days 6 tablet 3     No current facility-administered medications on file prior to visit  Allergies  No Known Allergies    Review of Systems    Objective  Vitals:    09/08/21 1431   Temp: 98 °F (36 7 °C)       Physical Exam     Breast exam    Right breast:  No discrete masses,  Firmer breast tissue in the upper-outer aspect of the breast   No axillary adenopathy   Left breast:  No discrete masses, firmness in the upper outer aspect of the breast with no discrete or worrisome findings    No axillary adenopathy

## 2021-09-08 NOTE — ASSESSMENT & PLAN NOTE
Her breast exam feels stable to this point time  Reviewed mammogram and ultrasound which were also stable  I think she can feel in the routine screening and follow-up with OBGYN  Would be happy to see her back if any questions or problems

## 2022-01-24 ENCOUNTER — HOSPITAL ENCOUNTER (OUTPATIENT)
Dept: ULTRASOUND IMAGING | Facility: CLINIC | Age: 42
Discharge: HOME | End: 2022-01-24
Payer: COMMERCIAL

## 2022-01-24 ENCOUNTER — HOSPITAL ENCOUNTER (OUTPATIENT)
Dept: MAMMOGRAPHY | Facility: CLINIC | Age: 42
Discharge: HOME | End: 2022-01-24
Payer: COMMERCIAL

## 2022-01-24 VITALS — WEIGHT: 141 LBS | BODY MASS INDEX: 23.49 KG/M2 | HEIGHT: 65 IN

## 2022-01-24 DIAGNOSIS — R92.8 ABNORMAL FINDING ON MAMMOGRAPHY: ICD-10-CM

## 2022-01-24 PROCEDURE — 76642 ULTRASOUND BREAST LIMITED: CPT

## 2022-01-24 PROCEDURE — G0279 TOMOSYNTHESIS, MAMMO: HCPCS

## 2022-01-24 PROCEDURE — 77066 DX MAMMO INCL CAD BI: CPT

## 2022-01-31 ENCOUNTER — TELEPHONE (OUTPATIENT)
Dept: OBGYN CLINIC | Facility: CLINIC | Age: 42
End: 2022-01-31

## 2022-02-14 ENCOUNTER — OFFICE VISIT (OUTPATIENT)
Dept: INTERNAL MEDICINE CLINIC | Facility: CLINIC | Age: 42
End: 2022-02-14

## 2022-02-14 VITALS
HEART RATE: 69 BPM | TEMPERATURE: 97.9 F | OXYGEN SATURATION: 99 % | DIASTOLIC BLOOD PRESSURE: 83 MMHG | BODY MASS INDEX: 23.22 KG/M2 | WEIGHT: 139.4 LBS | HEIGHT: 65 IN | SYSTOLIC BLOOD PRESSURE: 129 MMHG

## 2022-02-14 DIAGNOSIS — F33.9 DEPRESSION, RECURRENT (HCC): ICD-10-CM

## 2022-02-14 DIAGNOSIS — Z86.16 HISTORY OF 2019 NOVEL CORONAVIRUS DISEASE (COVID-19): ICD-10-CM

## 2022-02-14 DIAGNOSIS — G89.29 CHRONIC NONINTRACTABLE HEADACHE, UNSPECIFIED HEADACHE TYPE: ICD-10-CM

## 2022-02-14 DIAGNOSIS — R92.8 ABNORMAL MAMMOGRAM: ICD-10-CM

## 2022-02-14 DIAGNOSIS — Z23 NEED FOR INFLUENZA VACCINATION: ICD-10-CM

## 2022-02-14 DIAGNOSIS — R51.9 CHRONIC NONINTRACTABLE HEADACHE, UNSPECIFIED HEADACHE TYPE: ICD-10-CM

## 2022-02-14 DIAGNOSIS — Z00.00 ROUTINE ADULT HEALTH MAINTENANCE: Primary | ICD-10-CM

## 2022-02-14 PROCEDURE — 3725F SCREEN DEPRESSION PERFORMED: CPT | Performed by: PHYSICIAN ASSISTANT

## 2022-02-14 PROCEDURE — 99396 PREV VISIT EST AGE 40-64: CPT | Performed by: PHYSICIAN ASSISTANT

## 2022-02-14 PROCEDURE — 90471 IMMUNIZATION ADMIN: CPT | Performed by: PHYSICIAN ASSISTANT

## 2022-02-14 PROCEDURE — 90686 IIV4 VACC NO PRSV 0.5 ML IM: CPT | Performed by: PHYSICIAN ASSISTANT

## 2022-02-14 PROCEDURE — 1036F TOBACCO NON-USER: CPT | Performed by: PHYSICIAN ASSISTANT

## 2022-02-14 PROCEDURE — 3008F BODY MASS INDEX DOCD: CPT | Performed by: PHYSICIAN ASSISTANT

## 2022-02-14 RX ORDER — ESCITALOPRAM OXALATE 10 MG/1
10 TABLET ORAL DAILY
Qty: 30 TABLET | Refills: 2 | Status: SHIPPED | OUTPATIENT
Start: 2022-02-14 | End: 2022-04-04 | Stop reason: SDUPTHER

## 2022-02-14 RX ORDER — SUMATRIPTAN 50 MG/1
50 TABLET, FILM COATED ORAL ONCE AS NEEDED
Qty: 10 TABLET | Refills: 1 | Status: SHIPPED | OUTPATIENT
Start: 2022-02-14 | End: 2022-04-15 | Stop reason: SINTOL

## 2022-02-14 RX ORDER — CALCIUM CARBONATE 300MG(750)
TABLET,CHEWABLE ORAL
Qty: 90 TABLET | Refills: 3 | Status: SHIPPED | OUTPATIENT
Start: 2022-02-14

## 2022-02-14 NOTE — PROGRESS NOTES
Assessment/Plan:      Diagnoses and all orders for this visit:    Routine adult health maintenance    Depression, recurrent ([facility] [address] )  -     escitalopram (Lexapro) 10 mg tablet; Take 1 tablet (10 mg total) by mouth daily    Chronic nonintractable headache, unspecified headache type  -     Ambulatory Referral to Neurology; Future  -     Magnesium 400 MG TABS; Take 1 tab PO daily for headaches  -     SUMAtriptan (Imitrex) 50 mg tablet; Take 1 tablet (50 mg total) by mouth once as needed for migraine for up to 1 dose    Need for influenza vaccination  -     influenza vaccine, quadrivalent, 0 5 mL, preservative-free, for adult and pediatric patients 6 mos+ (AFLURIA, FLUARIX, FLULAVAL, FLUZONE)    History of 2019 novel coronavirus disease (COVID-19)  -     Ambulatory Referral to Neurology; Future    Abnormal mammogram      44-year-old female presenting today for annual physical       She reports feeling well but has concerns of posterior head aches that she reports has been since COVID however after looking back at her chart it appears she had [place] in 2020, November  She thinks it is been going on since that time but cannot clearly recall  She denies any other known triggering factors at this time  Since she is only getting headaches a few times a month would recommend sumatriptan 50 mg p r n , magnesium daily for maintenance and can add Tylenol or ibuprofen for unrelieved migraine with the Triptan  She is not exhibiting any significant neurological symptoms or deficits warranting imaging at this time  However since possible COVID long collar if this could be since her COVID infection will refer to Neurology for their input  Patient also has a history of depression and after discussing this with her further at the end of the visit she reported that she is struggling with depression    She is following with a therapist in [facility], whom her friend recommended since that person spoke [place] and there are very few within [place] who do so  Patient states that has been going well however her daughter who is also a therapist recommended she consider Lexapro  Patient is very open to trying medication  I will start her on 10 mg once a day with side effects discussed  Age-appropriate education regarding screenings and prevention were addressed with patient today  Flu vaccine administered  Encourage patient consider getting COVID booster  She is up-to-date with mammogram and did have diagnostic testing done recently  Results were reviewed with patient and she understands and agrees with plan of return to routine mammogram since findings benign  She does not seem to be due for any blood work at this time as blood work performed exactly a year ago were unremarkable  I did offer though she declines at this time  Can consider in 1-2 years to include hepatitis-C screening as well  With initiation of migraine treatment and Lexapro for her mental health she is agreeable to returning back to the office in 6 weeks  She will call sooner if there are any further concerns or problems with the medication  Chief Complaint   Patient presents with    Follow-up     1 year f/u       Subjective:     Patient ID: Taylor Elaine is a 39 y o  female  42y/o female here today for annual physical     States since having covid she has been having posterior headaches, covid positive 11/2020  States she gets about 2-3 HA's / month and can last a few days  She admits to associated light/sound sensitivity  She may take motrin or tylenol for pain  states at times HA may be more then 3-4 days and can be very painful  HA's are not related to menses  Last dental cleaning - > years ago  has appt tomorrow at other office  She does not wear vision correction  She does note amblyopia but denies any vision changes  She does take daily MVI  Also uses oral BC  She considers her diet healthy  She eats 4-5 x a day  She eats fruit 2 x a day  She eats vegetables 2 x a day  Drinks mainly water during the day - about 4 bottles/day  She exercises about 2-4 x a week - powerlifting  She does [facility] - [facility]  She states she has hx of endometriosis  She does not get any menses - does take oral BC  She is sexually active  Denies abnormal vaginal discharge, pelvic complaints or urinary issues    Lives in house - smoke alarms working  Wears seatbelt int he car  Feels safe in relationship with partner  She denies tobacco or ETOH  Denies drug use past or present  States she has been seeing a therapist in [facility]  States her daughter is a therapist and daughter is requesting she consider trying lexapro  She has not taking antidepressants before  She feels sometimes she feels worthless, feels bad and stressed  She does cry a lot as well  She staets she is open to trying medication as well, states she feels she needs something  Review of Systems   Constitutional: Negative  HENT: Negative  Respiratory: Negative  Cardiovascular: Negative  Gastrointestinal: Negative  Genitourinary: Negative  Musculoskeletal: Negative  Skin: Negative  Neurological:        As in HPI   Psychiatric/Behavioral:        As in HPI         The following portions of the patient's history were reviewed and updated as appropriate: allergies, current medications, past family history, past medical history, past social history, past surgical history and problem list       Objective:     Physical Exam  Vitals reviewed  Constitutional:       General: She is not in acute distress  Appearance: Normal appearance  She is not ill-appearing or toxic-appearing  HENT:      Head: Normocephalic and atraumatic        Right Ear: Tympanic membrane, ear canal and external ear normal       Left Ear: Tympanic membrane, ear canal and external ear normal       Nose: Nose normal       Mouth/Throat: Pharynx: Oropharynx is clear  Eyes:      General: No visual field deficit  Conjunctiva/sclera: Conjunctivae normal       Pupils: Pupils are equal, round, and reactive to light  Neck:      Thyroid: No thyroid tenderness  Vascular: Normal carotid pulses  No carotid bruit  Cardiovascular:      Rate and Rhythm: Normal rate and regular rhythm  Pulses: Normal pulses  Heart sounds: Normal heart sounds  Pulmonary:      Effort: Pulmonary effort is normal       Breath sounds: Normal breath sounds  Abdominal:      General: Abdomen is flat  Bowel sounds are normal       Palpations: Abdomen is soft  Tenderness: There is no abdominal tenderness  Musculoskeletal:      Cervical back: Neck supple  No rigidity  No pain with movement, spinous process tenderness or muscular tenderness  Normal range of motion  Right lower leg: No edema  Left lower leg: No edema  Lymphadenopathy:      Head:      Right side of head: No submandibular or tonsillar adenopathy  Left side of head: No submandibular or tonsillar adenopathy  Neurological:      Mental Status: She is alert and oriented to person, place, and time  Cranial Nerves: No facial asymmetry  Sensory: No sensory deficit  Motor: Motor function is intact  Coordination: Coordination is intact  Gait: Gait is intact  Psychiatric:         Mood and Affect: Mood is depressed (mild)  Speech: Speech normal          Behavior: Behavior normal  Behavior is cooperative             Vitals:    02/14/22 1521   BP: 129/83   BP Location: Left arm   Patient Position: Sitting   Cuff Size: Standard   Pulse: 69   Temp: 97 9 °F (36 6 °C)   TempSrc: Temporal   SpO2: 99%   Weight: 63 2 kg (139 lb 6 4 oz)   Height: 5' 5" (1 651 m)

## 2022-04-04 ENCOUNTER — OFFICE VISIT (OUTPATIENT)
Dept: INTERNAL MEDICINE CLINIC | Facility: CLINIC | Age: 42
End: 2022-04-04

## 2022-04-04 VITALS
SYSTOLIC BLOOD PRESSURE: 122 MMHG | HEART RATE: 72 BPM | DIASTOLIC BLOOD PRESSURE: 77 MMHG | BODY MASS INDEX: 23.63 KG/M2 | TEMPERATURE: 97.9 F | WEIGHT: 142 LBS | OXYGEN SATURATION: 99 %

## 2022-04-04 DIAGNOSIS — G89.29 CHRONIC NONINTRACTABLE HEADACHE, UNSPECIFIED HEADACHE TYPE: Primary | ICD-10-CM

## 2022-04-04 DIAGNOSIS — M54.50 CHRONIC BILATERAL LOW BACK PAIN WITHOUT SCIATICA: ICD-10-CM

## 2022-04-04 DIAGNOSIS — G89.29 CHRONIC BILATERAL LOW BACK PAIN WITHOUT SCIATICA: ICD-10-CM

## 2022-04-04 DIAGNOSIS — F33.9 DEPRESSION, RECURRENT (HCC): ICD-10-CM

## 2022-04-04 DIAGNOSIS — B00.9 HSV INFECTION: ICD-10-CM

## 2022-04-04 DIAGNOSIS — R51.9 CHRONIC NONINTRACTABLE HEADACHE, UNSPECIFIED HEADACHE TYPE: Primary | ICD-10-CM

## 2022-04-04 PROCEDURE — 1036F TOBACCO NON-USER: CPT | Performed by: HOSPITALIST

## 2022-04-04 PROCEDURE — 99213 OFFICE O/P EST LOW 20 MIN: CPT | Performed by: HOSPITALIST

## 2022-04-04 RX ORDER — VALACYCLOVIR HYDROCHLORIDE 500 MG/1
500 TABLET, FILM COATED ORAL 2 TIMES DAILY PRN
Qty: 6 TABLET | Refills: 3 | Status: SHIPPED | OUTPATIENT
Start: 2022-04-04 | End: 2022-08-04 | Stop reason: SDUPTHER

## 2022-04-04 RX ORDER — AMITRIPTYLINE HYDROCHLORIDE 50 MG/1
50 TABLET, FILM COATED ORAL
Qty: 30 TABLET | Refills: 3 | Status: SHIPPED | OUTPATIENT
Start: 2022-04-04 | End: 2022-04-04

## 2022-04-04 RX ORDER — ESCITALOPRAM OXALATE 10 MG/1
10 TABLET ORAL DAILY
Qty: 30 TABLET | Refills: 2 | Status: SHIPPED | OUTPATIENT
Start: 2022-04-04 | End: 2022-04-04 | Stop reason: ALTCHOICE

## 2022-04-04 RX ORDER — IBUPROFEN 800 MG/1
800 TABLET ORAL DAILY PRN
Qty: 15 TABLET | Refills: 0 | Status: SHIPPED | OUTPATIENT
Start: 2022-04-04

## 2022-04-04 RX ORDER — AMITRIPTYLINE HYDROCHLORIDE 50 MG/1
25 TABLET, FILM COATED ORAL
Qty: 30 TABLET | Refills: 3 | Status: SHIPPED | OUTPATIENT
Start: 2022-04-04 | End: 2022-04-15

## 2022-04-04 NOTE — PATIENT INSTRUCTIONS
Provider Chanel Templeton   7/20/2022 2:15 PM (Arrive by 2:00 PM) Markus Reynaga, RASHMI [facility]       Stop taking lexapro   Start taking Elavil at bedtime

## 2022-04-04 NOTE — PROGRESS NOTES
ASSESSMENT/PLAN:  Diagnoses and all orders for this visit:    Chronic nonintractable headache, unspecified headache type  -     ibuprofen (MOTRIN) 800 mg tablet; Take 1 tablet (800 mg total) by mouth daily as needed for headaches  -     Ambulatory Referral to [facility]; Future  -     Ambulatory Referral to Social Work Care Management Program; Future  -     amitriptyline (ELAVIL) 50 mg tablet; Take 0 5 tablets (25 mg total) by mouth daily at bedtime    HSV infection  -     valACYclovir (VALTREX) 500 mg tablet; Take 1 tablet (500 mg total) by mouth 2 (two) times a day as needed (HSV outbreak) for up to 3 days    Depression, recurrent ([facility] [address] )  -     Ambulatory Referral to [address] [place] [address]; Future  -     Ambulatory Referral to Social Work Care Management Program; Future  -     amitriptyline (ELAVIL) 50 mg tablet; Take 0 5 tablets (25 mg total) by mouth daily at bedtime  - Discontinue lexapro  - will try Elavil at bedtime as patient is experiencing migraines as well as depression and is having difficulty sleeping at bedtime  I explained side effects to the patient and she is interested in starting it  - patient agreeable to seeing Behavioral Health  Will consult  to assist in this  Chronic bilateral low back pain without sciatica  -     Ambulatory Referral to Physical Therapy; Future  - Has had lower back pain for 3 years  No sciatica  Health Maintenance:  Advised diet and exercise  Advised to refrain from tobacco, alcohol, illicit drug use  Advised medical compliance  Schedule a follow-up appointment in 8 weeks  CHIEF COMPLAINT: Follow up    HISTORY OF PRESENT ILLNESS:    Patient is a 19-year-old female with a past medical history significant for herpes labialis, bilateral ovarian cyst, depression, headaches/migraines  Patient is presenting today for follow-up on depression and headaches  Patient states that she has used sumatriptan twice since she was seen last time    Patient states that this does help with her headaches  She does state however that last time she took it she felt paresthesias around her mouth and down her bilateral arms  She has not taken it since then and has not had a headache since then  Patient states that the Lexapro is not helping her depression completely  She states that she feels that her brain is foggy and she is unable to concentrate once she started it  She states that she feels a little bit less depressed however she does not feel that it has helped for the most part  The following portions of the patient's history were reviewed and updated as appropriate: allergies, current medications, past family history, past medical history, past social history, past surgical history and problem list     10 point system ROS reviewed and negative unless stated otherwise above    OBJECTIVE:  Vitals:    04/04/22 1434   BP: 122/77   BP Location: Right arm   Patient Position: Sitting   Cuff Size: Standard   Pulse: 72   Temp: 97 9 °F (36 6 °C)   TempSrc: Temporal   SpO2: 99%   Weight: 64 4 kg (142 lb)     Physical Exam  Constitutional:       Appearance: Normal appearance  HENT:      Head: Normocephalic and atraumatic  Nose: Nose normal       Mouth/Throat:      Mouth: Mucous membranes are moist       Pharynx: Oropharynx is clear  Eyes:      Conjunctiva/sclera: Conjunctivae normal    Cardiovascular:      Rate and Rhythm: Normal rate and regular rhythm  Heart sounds: No murmur heard  No friction rub  No gallop  Pulmonary:      Effort: Pulmonary effort is normal  No respiratory distress  Breath sounds: Normal breath sounds  No stridor  No wheezing, rhonchi or rales  Chest:      Chest wall: No tenderness  Abdominal:      General: Bowel sounds are normal  There is no distension  Palpations: Abdomen is soft  There is no mass  Tenderness: There is no abdominal tenderness  There is no guarding or rebound        Hernia: No hernia is present  Musculoskeletal:         General: Normal range of motion  Cervical back: Normal range of motion  Comments: Bilateral negative straight leg raise  Skin:     General: Skin is warm and dry  Neurological:      General: No focal deficit present  Mental Status: She is alert and oriented to person, place, and time     Psychiatric:         Mood and Affect: Mood normal            Current Outpatient Medications:     escitalopram (Lexapro) 10 mg tablet, Take 1 tablet (10 mg total) by mouth daily, Disp: 30 tablet, Rfl: 2    Magnesium 400 MG TABS, Take 1 tab PO daily for headaches, Disp: 90 tablet, Rfl: 3    Multiple Vitamins-Minerals (MULTIVITAMIN WITH MINERALS) tablet, Take 1 tablet by mouth daily, Disp: , Rfl:     norgestimate-ethinyl estradiol (ORTHO-CYCLEN) 0 25-35 MG-MCG per tablet, Take 1 tablet by mouth daily, Disp: 64 tablet, Rfl: 1    SUMAtriptan (Imitrex) 50 mg tablet, Take 1 tablet (50 mg total) by mouth once as needed for migraine for up to 1 dose, Disp: 10 tablet, Rfl: 1    valACYclovir (VALTREX) 500 mg tablet, Take 1 tablet (500 mg total) by mouth 2 (two) times a day as needed (HSV outbreak) for up to 3 days, Disp: 6 tablet, Rfl: 3    ibuprofen (MOTRIN) 800 mg tablet, Take 1 tablet (800 mg total) by mouth daily as needed for headaches, Disp: 15 tablet, Rfl: 0    Past Medical History:   Diagnosis Date    Endometriosis      Past Surgical History:   Procedure Laterality Date     SECTION      , 2006    HYSTERECTOMY N/A 2016    Procedure: EXCISION  SCAR ENDOMETRIOSIS painful nodule;  Surgeon: Brice Shi MD;  Location: [facility];  Service:      Social History     Socioeconomic History    Marital status: /Civil Union     Spouse name: Not on file    Number of children: 2    Years of education: Not on file    Highest education level: Not on file   Occupational History    Occupation: cleaning    Tobacco Use    Smoking status: Never Smoker    Smokeless tobacco: Never Used   Vaping Use    Vaping Use: Never used   Substance and Sexual Activity    Alcohol use: No    Drug use: No    Sexual activity: Yes     Partners: Male     Birth control/protection: OCP   Other Topics Concern    Not on file   Social History Narrative    Not on file     Social Determinants of Health     Financial Resource Strain: Low Risk     Difficulty of Paying Living Expenses: Not hard at all   Food Insecurity: No Food Insecurity    Worried About Running Out of Food in the Last Year: Never true    [place] of Food in the Last Year: Never true   Transportation Needs: No Transportation Needs    Lack of Transportation (Medical): No    Lack of Transportation (Non-Medical): No   Physical Activity: Not on file   Stress: Stress Concern Present    Feeling of Stress :  To some extent   Social Connections: Not on file   Intimate Partner Violence: Not on file   Housing Stability: [address] Unable to Pay for Housing in the Last Year: No    Number of Places Lived in the Last Year: 1    Unstable Housing in the Last Year: No     Family History   Problem Relation Age of Onset    No Known Problems Mother     No Known Problems Father     No Known Problems Sister     No Known Problems Daughter     No Known Problems Maternal Grandmother     No Known Problems Maternal Grandfather     No Known Problems Paternal Grandmother     No Known Problems Paternal Grandfather     No Known Problems Son     Endometrial cancer Maternal Aunt     Cancer Maternal Aunt     No Known Problems Paternal Aunt     Cancer Paternal Aunt         type unknown    BRCA2 Positive Neg Hx     BRCA2 Negative Neg Hx     BRCA1 Positive Neg Hx     BRCA1 Negative Neg Hx     BRCA 1/2 Neg Hx     Ovarian cancer Neg Hx     Colon cancer Neg Hx     Breast cancer Neg Hx     Breast cancer additional onset Neg Hx        ==  MD [facility] PGY-3    Nnamdi [address] , [address] [facility] [address] [facility]  Office: [phone]  Fax: [phone]

## 2022-04-05 ENCOUNTER — PATIENT OUTREACH (OUTPATIENT)
Dept: INTERNAL MEDICINE CLINIC | Facility: CLINIC | Age: 42
End: 2022-04-05

## 2022-04-05 NOTE — PROGRESS NOTES
SWCM received a new referral in regard to pt with recurrent depression  Per chart review, pt was seen on 4/4/2022 and expressed she is having migraines, depression, and difficulty sleeping  Pt was agreeable to provider making a referral to behavioral health and social work  I attempted to reach pt today and left a message to call myself today and on site [facility] if after today  [facility] will remain available

## 2022-04-08 ENCOUNTER — TELEPHONE (OUTPATIENT)
Dept: NEUROLOGY | Facility: CLINIC | Age: 42
End: 2022-04-08

## 2022-04-08 NOTE — TELEPHONE ENCOUNTER
1st attempt to reschedule patient who was scheduled with Kat Calle to Dr Arlen Vigil in at [facility]  No answer  LMOM  Cancelled appointment with Kat Calle as she doesn't see new patients

## 2022-04-11 ENCOUNTER — TELEPHONE (OUTPATIENT)
Dept: PSYCHIATRY | Facility: CLINIC | Age: 42
End: 2022-04-11

## 2022-04-11 ENCOUNTER — PATIENT OUTREACH (OUTPATIENT)
Dept: INTERNAL MEDICINE CLINIC | Facility: CLINIC | Age: 42
End: 2022-04-11

## 2022-04-11 NOTE — PROGRESS NOTES
SW has spoken with pt briefly this date  Pt relates has gotten into private therapy services but is still trying to get OP Psychiatric services  Pt share she has no SI/HI  MARGARET attempted to make a 3 way call to [facility] as a referral was started last year 1/2021 but SW lost pt on call  Margaret did f/u with [facility] [address]  [phone] and spoke to Kaylie who notes referral was started last year but pt did not call back and is not in their wait list     She has been added today but  the wait list is long at this time  SW did attempted to reach pt again to help with additional resource but SW had to leave a message  SW called back aans was able to speak with pt who relates she is paying privately for her therapist   [address]ra would consider switching is she finds a program offreing Psychiatry and psychotherapy  At pt request and with her permission MARGARET has called [facility] [phone] and left a message for them to return pt's call  They later returned SW call and shared they do not take pt's insurance  4/12/22  ADDENDUM:    MARGARET has reached out to [facility]  [phone] and left them a message to call pt to offer an appointment  SW was then able to reach our to pt and together we were able to call [facility] who was able to offer an INTAKE appointment for 5/3/22 At 12:00 PM  [phone]  This program and offer both medication management and/or psychotherapy  We also called [facility] [phone]  and they were able to schedule with a Therapist appointment with  Daily Nelson for Thursday 4/28/22 @1:15 PM      This location is much closer but they require the pt get both medication management and therapy  Pt to consider these options and will cancel one of them      MARGARET has shared that MARGARET has also looked up additional options on the [facility] Today website listed by Providers and insurance as will mail it to pt along with the list we have at our Office  Pt await she is on the wait list for [facility] [employer] as well  Pt comfortable with same  Pt hs agrees to f/u with SW if needed

## 2022-04-15 ENCOUNTER — CONSULT (OUTPATIENT)
Dept: NEUROLOGY | Facility: CLINIC | Age: 42
End: 2022-04-15
Payer: COMMERCIAL

## 2022-04-15 VITALS
HEART RATE: 74 BPM | BODY MASS INDEX: 23.56 KG/M2 | HEIGHT: 65 IN | SYSTOLIC BLOOD PRESSURE: 110 MMHG | DIASTOLIC BLOOD PRESSURE: 60 MMHG | WEIGHT: 141.4 LBS

## 2022-04-15 DIAGNOSIS — G43.009 MIGRAINE WITHOUT AURA, NOT INTRACTABLE, WITHOUT STATUS MIGRAINOSUS: Primary | ICD-10-CM

## 2022-04-15 DIAGNOSIS — Z86.16 HISTORY OF 2019 NOVEL CORONAVIRUS DISEASE (COVID-19): ICD-10-CM

## 2022-04-15 DIAGNOSIS — F33.9 DEPRESSION, RECURRENT (HCC): ICD-10-CM

## 2022-04-15 PROCEDURE — 3008F BODY MASS INDEX DOCD: CPT | Performed by: STUDENT IN AN ORGANIZED HEALTH CARE EDUCATION/TRAINING PROGRAM

## 2022-04-15 PROCEDURE — 3008F BODY MASS INDEX DOCD: CPT | Performed by: HOSPITALIST

## 2022-04-15 PROCEDURE — 99204 OFFICE O/P NEW MOD 45 MIN: CPT | Performed by: STUDENT IN AN ORGANIZED HEALTH CARE EDUCATION/TRAINING PROGRAM

## 2022-04-15 PROCEDURE — 1036F TOBACCO NON-USER: CPT | Performed by: STUDENT IN AN ORGANIZED HEALTH CARE EDUCATION/TRAINING PROGRAM

## 2022-04-15 RX ORDER — RIZATRIPTAN BENZOATE 10 MG/1
10 TABLET ORAL ONCE AS NEEDED
Qty: 9 TABLET | Refills: 2 | Status: SHIPPED | OUTPATIENT
Start: 2022-04-15 | End: 2022-05-15

## 2022-04-15 RX ORDER — AMITRIPTYLINE HYDROCHLORIDE 50 MG/1
50 TABLET, FILM COATED ORAL
Qty: 30 TABLET | Refills: 1 | Status: SHIPPED | OUTPATIENT
Start: 2022-04-15 | End: 2022-08-04

## 2022-04-15 NOTE — PROGRESS NOTES
[address] Neurology Consult  PATIENT:  Rajiv Sawyer  MRN:  [id number]  :  1980  DATE OF SERVICE:  4/15/2022  REFERRED BY: Sandip Duenas  PMD: Anna De Guzman MD    Assessment/Plan:     Rajiv Sawyer is a very pleasant  39 y o  female with a past medical history that includes depression referred here for evaluation of headache  #migraine without aura, not intractable, without status migrainosus  Preventative:  - we discussed headache hygiene and lifestyle factors that may improve headaches  - uptitrated amitriptyline to 50 mg qHS  Just started by PCP on , may take several weeks to see full effects but tolerating well at present    Abortive:  - discussed not taking over-the-counter or prescription pain medications more than 3 days per week to prevent medication overuse/rebound headache  - switched from sumatriptan to rizatriptan due to side effects  If needed, can try ubrelvy/reyvow/nurtec in the future    Follow up:  - f/u in 3 months      CC:   Headaches    History of Present Illness:   40 yo F PMHx depression presenting for evaluation of headaches  She states that her headaches started when she was a teenager, but have increased in frequency since she contracted COVID in   She was started on sumatriptan by her PCP in February of this year  She states that she took the medication once, experienced paresthesias in her arms, and hasn't taken it since then  She was seen again  and started on amitriptyline 25 mg qHS  She endorses compliance but hasn't noticed any change in headache frequency  Headaches started at what age? In her teenage years  How often do the headaches occur?   - as of 4/15/2022: 3 times a month for 3-4 days  What time of the day do the headaches start? No particular time of day   How long do the headaches last? 3-4 days  Are you ever headache free?  Yes    Aura? without aura     Last eye exam: 2 years ago    Where is your headache located and pain quality? Unilateral behind eye and radiates to rest of head  What is the intensity of pain? Average: 11-12/10  Associated symptoms:   [x] Nausea       [] Vomiting        [] Diarrhea  [] Stiff or sore neck   [x] Problems with concentration  [x] Photophobia     [x]Phonophobia      [] Osmophobia  [] Blurred vision   [] Prefer quiet, dark room  [x] Light-headed or dizzy     [] Tinnitus   [] Hands or feet tingle or feel numb/paresthesias      [] Ptosis      [] Facial droop  [] Lacrimation  [] Nasal congestion/rhinorrhea        Things that make the headache worse? Physical activity    Headache triggers:  stress    Have you seen someone else for headaches or pain? No  Have you had trigger point injection performed and how often? No  Have you had Botox injection performed and how often? No   Have you had epidural injections or transforaminal injections performed? No  Are you current pregnant or planning on getting pregnant? On [address]  Have you ever had any Brain imaging? no    What medications do you take or have you taken for your headaches? ABORTIVE:    Motrin - takes the edge off  Taking a few times during the day when she has them  Advil, tylenol ineffective  Sumatriptan - self-d/c due to paresthesias    PREVENTIVE:   Just started on amitriptyline 25 mg qhs by her PCP on 4/4  No improvement yet    LIFESTYLE  Sleep   - averages: 5 hrs a night  Problems falling asleep?:   Yes - anxiety  Problems staying asleep?:  Yes - very light sleeper    Water: 4-5 bottles a day per day  Caffeine: coffee in the morning only   No additional caffeine throughout the day    Mood:   Endorses history of depression    The following portions of the patient's history were reviewed and updated as appropriate: allergies, current medications, past family history, past medical history, past social history, past surgical history and problem list     Pertinent family history:  Family history of headaches:  no known family members with significant headaches  Any family history of aneurysms - No    Pertinent social history:  Work: cleaning houses  Lives with 2 children, , and 2 dogs    Illicit Drugs: denies  Alcohol/tobacco: Denies alcohol use, Denies tobacco use    Past Medical History:     Past Medical History:   Diagnosis Date    Endometriosis        Patient Active Problem List   Diagnosis    Left breast mass    Herpes labialis    Routine screening for STI (sexually transmitted infection)    Encounter for gynecological examination without abnormal finding    Abnormal mammogram    Cysts of both ovaries    Depression, recurrent (HCC)    Chronic nonintractable headache       Medications:      Current Outpatient Medications   Medication Sig Dispense Refill    amitriptyline (ELAVIL) 50 mg tablet Take 0 5 tablets (25 mg total) by mouth daily at bedtime 30 tablet 3    ibuprofen (MOTRIN) 800 mg tablet Take 1 tablet (800 mg total) by mouth daily as needed for headaches 15 tablet 0    Magnesium 400 MG TABS Take 1 tab PO daily for headaches 90 tablet 3    Multiple Vitamins-Minerals (MULTIVITAMIN WITH MINERALS) tablet Take 1 tablet by mouth daily      norgestimate-ethinyl estradiol (ORTHO-CYCLEN) 0 25-35 MG-MCG per tablet Take 1 tablet by mouth daily 64 tablet 1    SUMAtriptan (Imitrex) 50 mg tablet Take 1 tablet (50 mg total) by mouth once as needed for migraine for up to 1 dose 10 tablet 1    valACYclovir (VALTREX) 500 mg tablet Take 1 tablet (500 mg total) by mouth 2 (two) times a day as needed (HSV outbreak) for up to 3 days 6 tablet 3     No current facility-administered medications for this visit          Allergies:    No Known Allergies    Family History:     Family History   Problem Relation Age of Onset    No Known Problems Mother     No Known Problems Father     No Known Problems Sister     No Known Problems Daughter     No Known Problems Maternal Grandmother     No Known Problems Maternal Grandfather     No Known Problems Paternal Grandmother     No Known Problems Paternal Grandfather     No Known Problems Son     Endometrial cancer Maternal Aunt     Cancer Maternal Aunt     No Known Problems Paternal Aunt     Cancer Paternal Aunt         type unknown    BRCA2 Positive Neg Hx     BRCA2 Negative Neg Hx     BRCA1 Positive Neg Hx     BRCA1 Negative Neg Hx     BRCA 1/2 Neg Hx     Ovarian cancer Neg Hx     Colon cancer Neg Hx     Breast cancer Neg Hx     Breast cancer additional onset Neg Hx        Social History:       Social History     Socioeconomic History    Marital status: /Civil Union     Spouse name: Not on file    Number of children: 2    Years of education: Not on file    Highest education level: Not on file   Occupational History    Occupation: cleaning    Tobacco Use    Smoking status: Never Smoker    Smokeless tobacco: Never Used   Vaping Use    Vaping Use: Never used   Substance and Sexual Activity    Alcohol use: No    Drug use: No    Sexual activity: Yes     Partners: Male     Birth control/protection: OCP   Other Topics Concern    Not on file   Social History Narrative    Not on file     Social Determinants of Health     Financial Resource Strain: Low Risk     Difficulty of Paying Living Expenses: Not hard at all   Food Insecurity: No Food Insecurity    Worried About Running Out of Food in the Last Year: Never true    [place] of Food in the Last Year: Never true   Transportation Needs: No Transportation Needs    Lack of Transportation (Medical): No    Lack of Transportation (Non-Medical): No   Physical Activity: Not on file   Stress: Stress Concern Present    Feeling of Stress :  To some extent   Social Connections: Not on file   Intimate Partner Violence: Not on file   Housing Stability: [address] Unable to Pay for Housing in the Last Year: No    Number of [place] in the Last Year: 1    Unstable Housing in the Last Year: No         Objective:   /60 (BP Location: Left arm, Patient Position: Sitting, Cuff Size: Standard)   Pulse 74   Ht 5' 5" (1 651 m)   Wt 64 1 kg (141 lb 6 4 oz)   BMI 23 53 kg/m²     General: Patient is not in any acute/apparent distress, well nourished, well developed and cooperative  HEENT: normocephalic, atraumatic, moist membranes  Neck: supple  Extremities: no edema noted   Skin: no lesions or rash  Musculosketal: no bony abnormalities    Neurologic Examination:   Mental status: alert, awake, oriented X 3 and following commands  Speech/Language: Speech is fluent without any dysarthria, no aphasia noted, can name, repeat, and comprehension intact    Cranial Nerves:   CN I: smell not tested  CN II: Visual fields full to confrontation, fundus - no papilledema noted  CN III, IV, VI: Extraocular movements intact bilaterally  Pupils equal round and reactive to light bilaterally  CN V: Facial sensation is normal   CN VII: Full and symmetric facial movement  CN VIII: Hearing is normal   CN IX, X: Palate elevates symmetrically  CN XI: Shoulder shrug strength is normal   CN XII: Tongue midline without atrophy or fasciculations  Motor:   Strength 5/5 in all 4 extremities  No pronator drift  Normal rapid alternating movements  Bulk/tone - normal   Fasiculations - none    Sensory:   Sensation intact to soft touch in all 4 extremities  Cerebellar:   Finger-to-nose intact, normal heel to shin  Reflexes: 2+ in all 4 extremities  Pathologic reflexes - babinski reflex negative  Gait:   Normal gait, able to tandem walk, able to tip-toe, able to walk on heels, normal arm swing    Pertinent lab results: none     Imaging: none         Review of Systems:     Review of Systems   Constitutional: Negative  Negative for appetite change and fever  HENT: Negative  Negative for hearing loss, tinnitus, trouble swallowing and voice change  Eyes: Positive for photophobia and pain  Respiratory: Negative  Negative for shortness of breath  Cardiovascular: Negative  Negative for palpitations  Gastrointestinal: Positive for nausea (Sometimes) and vomiting (Sometimes)  Endocrine: Negative  Negative for cold intolerance  Genitourinary: Negative  Negative for dysuria, frequency and urgency  Musculoskeletal: Positive for myalgias (Sometimes) and neck pain (Sometimes)  Skin: Negative  Negative for rash  Neurological: Positive for dizziness and headaches  Negative for tremors, seizures, syncope, facial asymmetry, speech difficulty, weakness, light-headedness and numbness  Hematological: Negative  Does not bruise/bleed easily  Psychiatric/Behavioral: Positive for sleep disturbance  Negative for confusion and hallucinations  I have spent 46 minutes with Patient today in which greater than 50% of this time was spent in counseling/coordination of care regarding Risks and benefits of tx options, Intructions for management, Patient and family education, Risk factor reductions and Impressions  I also spent 30 minutes non face to face for this patient the same day         Author:  Avel Chanel MD 4/15/2022 12:44 PM

## 2022-04-15 NOTE — PATIENT INSTRUCTIONS
Patient instructions        Headache/migraine treatment:   Abortive medications (for immediate treatment of a headache): It is ok to take ibuprofen, acetaminophen or naproxen (Advil, Tylenol,  Aleve, Excedrin) if they help your headaches you should limit these to no more than 3 times a week to avoid medication overuse/rebound headaches  For your more moderate to severe migraines take this medication early   Maxalt (rizatriptan) 10mg tabs - take one at the onset of headache  May repeat one time after 1-2 hours if pain has not resolved  (Max 2 a day and 9 a month)     - some people may have some side effects from this medication, most typically do not  Common side effects include making you feel tired, palpitations, tingling or tightness of the face or chest   Most people report the side effects are nothing compared to their migraines and do not mind these  If you have intolerable side effects we will stop  Over the counter preventive supplements for headaches/migraines (if you try, try for 3 months straight)  (to take every day to help prevent headaches - not to take at the time of headache): There are combo pills online of these - none of which regulated by FDA and double check dosing - take appropriate dose only once a day- preventa migraine, migravent, mind ease, migrelief   [] Magnesium 400mg daily (If any diarrhea or upset stomach, decrease dose  as tolerated)  [] Riboflavin (Vitamin B2) 400mg daily - try online   (FYI B2 may make your urine bright/neon yellow)  AND/OR  [] Herbal medication: Petasites/Butterbur 150 mg daily - try online  (When choosing your Butterbur online or in the store, beware that there are some poor preps containing pyrrolizidine alkaloids (PAs) that can be harmful to the liver  Therefore, do not use butterbur products that are not labeled as PA-free )    Sleep and headache prevention:   [] Melatonin - you may take 3 mg nightly for sleep   You should take this 1 hour prior to bedtime consistently every night for it to work  It works by gradually helping to adjust your sleep time over days to weeks, rather than immediately making you feel sleepy  Prescription preventive medications for headaches/migraines   (to take every day to help prevent headaches - not to take at the time of headache):  [x] Increase amytriptyline to 50 mg mg at bedtime        *Typically these types of medications take time untill you see the benefit, although some may see improvement in days, often it may take weeks, especially if the medication is being titrated up to a beneficial level  Please contact us if there are any concerns or questions regarding the medication  Lifestyle Recommendations:  [x] SLEEP - Maintain a regular sleep schedule: Adults need at least 7-8 hours of uninterrupted a night  Maintain good sleep hygiene:  Going to bed and waking up at consistent times, avoiding excessive daytime naps, avoiding caffeinated beverages in the evening, avoid excessive stimulation in the evening and generally using bed primarily for sleeping  One hour before bedtime would recommend turning lights down lower, decreasing your activity (may read quietly, listen to music at a low volume)  When you get into bed, should eliminate all technology (no texting, emailing, playing with your phone, iPad or tablet in bed)  [x] HYDRATION - Maintain good hydration  Drink  2L of fluid a day (4 typical small water bottles)  [x] DIET - Maintain good nutrition  In particular don't skip meals and try and eat healthy balanced meals regularly  [x] TRIGGERS - Look for other triggers and avoid them: Limit caffeine to 1-2 cups a day or less  Avoid dietary triggers that you have noticed bring on your headaches (this could include aged cheese, peanuts, MSG, aspartame and nitrates)    [x] EXERCISE - physical exercise as we all know is good for you in many ways, and not only is good for your heart, but also is beneficial for your mental health, cognitive health and  chronic pain/headaches  I would encourage at the least 5 days of physical exercise weekly for at least 30 minutes  Education and Follow-up  [x] Please call with any questions or concerns  Of course if any new concerning symptoms go to the emergency department    [x] Follow up in 3 months

## 2022-05-23 PROBLEM — Z11.3 ROUTINE SCREENING FOR STI (SEXUALLY TRANSMITTED INFECTION): Status: RESOLVED | Noted: 2020-07-29 | Resolved: 2022-05-23

## 2022-05-23 PROBLEM — Z01.419 ENCOUNTER FOR GYNECOLOGICAL EXAMINATION WITHOUT ABNORMAL FINDING: Status: RESOLVED | Noted: 2020-07-29 | Resolved: 2022-05-23

## 2022-06-06 ENCOUNTER — PATIENT OUTREACH (OUTPATIENT)
Dept: INTERNAL MEDICINE CLINIC | Facility: CLINIC | Age: 42
End: 2022-06-06

## 2022-06-06 NOTE — PROGRESS NOTES
MARGARET STERN attempted to contact Pt by phone today to follow up on [address] services for Pt  MARGARET Putnam had assisted Pt in setting up intake appointments for [facility] 5/3/22 as well as [facility] 4/28/22  Pt was unavailable at the time of the call however MARGARET STERN left  for returned call  Update:     Pt returned call today and stated she has been current with [facility] and continues to attend  Pt is very thankful for MARGARET Betancur's assistance with establishing care and today's follow up  Pt reports that she is otherwise doing very well  MARGARET STERN encouraged Pt to outreach to MARGARET STERN if she has any further questions or concerns and Pt expressed gratitude and understanding

## 2022-07-14 ENCOUNTER — TELEPHONE (OUTPATIENT)
Dept: NEUROLOGY | Facility: CLINIC | Age: 42
End: 2022-07-14

## 2022-07-14 NOTE — TELEPHONE ENCOUNTER
THE [facility] to confirm patient's 7/15/2022 @ 12:30 pm appointment with Dr Francisca Walsh at the [facility]  Call back number given [phone]

## 2022-08-01 ENCOUNTER — OFFICE VISIT (OUTPATIENT)
Dept: URGENT CARE | Age: 42
End: 2022-08-01
Payer: COMMERCIAL

## 2022-08-01 VITALS
HEART RATE: 98 BPM | DIASTOLIC BLOOD PRESSURE: 88 MMHG | OXYGEN SATURATION: 98 % | TEMPERATURE: 97 F | RESPIRATION RATE: 18 BRPM | SYSTOLIC BLOOD PRESSURE: 151 MMHG

## 2022-08-01 DIAGNOSIS — H60.11 CELLULITIS OF AURICLE OF RIGHT EAR: Primary | ICD-10-CM

## 2022-08-01 PROCEDURE — 99213 OFFICE O/P EST LOW 20 MIN: CPT | Performed by: STUDENT IN AN ORGANIZED HEALTH CARE EDUCATION/TRAINING PROGRAM

## 2022-08-01 RX ORDER — TEMAZEPAM 15 MG/1
15 CAPSULE ORAL
COMMUNITY
Start: 2022-06-02

## 2022-08-01 RX ORDER — AMOXICILLIN AND CLAVULANATE POTASSIUM 875; 125 MG/1; MG/1
1 TABLET, FILM COATED ORAL EVERY 12 HOURS SCHEDULED
Qty: 14 TABLET | Refills: 0 | Status: SHIPPED | OUTPATIENT
Start: 2022-08-01 | End: 2022-08-08

## 2022-08-01 NOTE — PROGRESS NOTES
[facility]        NAME: Crissy Gilbert is a 39 y o  female  : 1980    MRN: [id number]  DATE: 2022  TIME: 3:35 PM    Assessment and Plan   Cellulitis of auricle of right ear [H60 11]  1  Cellulitis of auricle of right ear  amoxicillin-clavulanate (AUGMENTIN) 875-125 mg per tablet         Patient Instructions       Follow up with PCP in 3-5 days  Proceed to  ER if symptoms worsen  Chief Complaint     Chief Complaint   Patient presents with    Earache         History of Present Illness       HPI  Patient presents today complaining of right external ear pain, she thinks her piercing med of gotten infected  Patient denies any fevers or chills, has been taking ibuprofen for the pain, does not have any fevers or chills  Review of Systems   Review of Systems  Per hpi     Current Medications       Current Outpatient Medications:     amoxicillin-clavulanate (AUGMENTIN) 875-125 mg per tablet, Take 1 tablet by mouth every 12 (twelve) hours for 7 days, Disp: 14 tablet, Rfl: 0    amitriptyline (ELAVIL) 50 mg tablet, Take 1 tablet (50 mg total) by mouth daily at bedtime, Disp: 30 tablet, Rfl: 1    ibuprofen (MOTRIN) 800 mg tablet, Take 1 tablet (800 mg total) by mouth daily as needed for headaches, Disp: 15 tablet, Rfl: 0    Magnesium 400 MG TABS, Take 1 tab PO daily for headaches, Disp: 90 tablet, Rfl: 3    Multiple Vitamins-Minerals (MULTIVITAMIN WITH MINERALS) tablet, Take 1 tablet by mouth daily, Disp: , Rfl:     norgestimate-ethinyl estradiol (ORTHO-CYCLEN) 0 25-35 MG-MCG per tablet, Take 1 tablet by mouth daily, Disp: 64 tablet, Rfl: 1    rizatriptan (Maxalt) 10 MG tablet, Take 1 tablet (10 mg total) by mouth once as needed for migraine May repeat in 2 hours if needed, Disp: 9 tablet, Rfl: 2    sertraline (ZOLOFT) 50 mg tablet, TAKE 1/2 TABLET BY MOUTH IN THE MORNING X 1 WEEK   THEN TAKE 1 FULL TABLET DAILY IN AM, Disp: , Rfl:     temazepam (RESTORIL) 15 mg capsule, Take 15 mg by mouth daily at bedtime, Disp: , Rfl:     valACYclovir (VALTREX) 500 mg tablet, Take 1 tablet (500 mg total) by mouth 2 (two) times a day as needed (HSV outbreak) for up to 3 days, Disp: 6 tablet, Rfl: 3    Current Allergies     Allergies as of 2022    (No Known Allergies)            The following portions of the patient's history were reviewed and updated as appropriate: allergies, current medications, past family history, past medical history, past social history, past surgical history and problem list      Past Medical History:   Diagnosis Date    Endometriosis        Past Surgical History:   Procedure Laterality Date     SECTION      ,     HYSTERECTOMY N/A 2016    Procedure: EXCISION  SCAR ENDOMETRIOSIS painful nodule;  Surgeon: Deborah Steele MD;  Location:  MAIN OR;  Service:        Family History   Problem Relation Age of Onset    No Known Problems Mother     No Known Problems Father     No Known Problems Sister     No Known Problems Daughter     No Known Problems Maternal Grandmother     No Known Problems Maternal Grandfather     No Known Problems Paternal Grandmother     No Known Problems Paternal Grandfather     No Known Problems Son     Endometrial cancer Maternal Aunt     Cancer Maternal Aunt     No Known Problems Paternal Aunt     Cancer Paternal Aunt         type unknown    BRCA2 Positive Neg Hx     BRCA2 Negative Neg Hx     BRCA1 Positive Neg Hx     BRCA1 Negative Neg Hx     BRCA 1/2 Neg Hx     Ovarian cancer Neg Hx     Colon cancer Neg Hx     Breast cancer Neg Hx     Breast cancer additional onset Neg Hx          Medications have been verified  Objective   /88   Pulse 98   Temp (!) 97 °F (36 1 °C)   Resp 18   SpO2 98%   No LMP recorded  Physical Exam     Physical Exam  Constitutional:       General: She is not in acute distress  Appearance: Normal appearance  HENT:      Head: Normocephalic  Right Ear: Tympanic membrane normal       Left Ear: Tympanic membrane normal       Ears:      Comments: Erythema warmth and tenderness of the right external ear     Nose: No congestion or rhinorrhea  Mouth/Throat:      Mouth: Mucous membranes are moist       Pharynx: No oropharyngeal exudate or posterior oropharyngeal erythema  Eyes:      General:         Right eye: No discharge  Left eye: No discharge  Conjunctiva/sclera: Conjunctivae normal    Cardiovascular:      Rate and Rhythm: Normal rate and regular rhythm  Pulses: Normal pulses  Pulmonary:      Effort: Pulmonary effort is normal  No respiratory distress  Abdominal:      General: Abdomen is flat  There is no distension  Palpations: Abdomen is soft  Tenderness: There is no abdominal tenderness  Musculoskeletal:      Cervical back: Neck supple  Skin:     General: Skin is warm  Capillary Refill: Capillary refill takes less than 2 seconds  Neurological:      Mental Status: She is alert and oriented to person, place, and time

## 2022-08-04 ENCOUNTER — TELEPHONE (OUTPATIENT)
Dept: INTERNAL MEDICINE CLINIC | Facility: CLINIC | Age: 42
End: 2022-08-04

## 2022-08-04 ENCOUNTER — OFFICE VISIT (OUTPATIENT)
Dept: INTERNAL MEDICINE CLINIC | Facility: CLINIC | Age: 42
End: 2022-08-04

## 2022-08-04 VITALS — WEIGHT: 147 LBS | BODY MASS INDEX: 24.46 KG/M2

## 2022-08-04 DIAGNOSIS — H60.11 CELLULITIS OF AURICLE OF RIGHT EAR: Primary | ICD-10-CM

## 2022-08-04 DIAGNOSIS — B00.9 HSV INFECTION: ICD-10-CM

## 2022-08-04 PROCEDURE — 99213 OFFICE O/P EST LOW 20 MIN: CPT | Performed by: HOSPITALIST

## 2022-08-04 PROCEDURE — 3725F SCREEN DEPRESSION PERFORMED: CPT | Performed by: HOSPITALIST

## 2022-08-04 RX ORDER — CEPHALEXIN 500 MG/1
500 CAPSULE ORAL EVERY 6 HOURS SCHEDULED
Qty: 28 CAPSULE | Refills: 0 | Status: SHIPPED | OUTPATIENT
Start: 2022-08-04 | End: 2022-08-11

## 2022-08-04 RX ORDER — SULFAMETHOXAZOLE AND TRIMETHOPRIM 800; 160 MG/1; MG/1
2 TABLET ORAL EVERY 12 HOURS SCHEDULED
Qty: 28 TABLET | Refills: 0 | Status: SHIPPED | OUTPATIENT
Start: 2022-08-04 | End: 2022-08-11

## 2022-08-04 RX ORDER — VALACYCLOVIR HYDROCHLORIDE 500 MG/1
500 TABLET, FILM COATED ORAL 2 TIMES DAILY PRN
Qty: 6 TABLET | Refills: 3 | Status: SHIPPED | OUTPATIENT
Start: 2022-08-04 | End: 2022-09-08

## 2022-08-04 NOTE — PROGRESS NOTES
INTERNAL MEDICINE OFFICE VISIT  [address] [address] [address] [facility]    NAME: Saira Weathers  AGE: 39 y o  SEX: female    DATE OF ENCOUNTER: 8/4/2022    Assessment and Plan     1  Purulent cellulitis of auricle of right ear  Infection 1st began approximately 1-2 weeks ago  Did not respond to Augmentin therapy  Need to cover for Streptococcus and Staphylococcus  Provided some source control with drainage under manual pressure in office, and prescribed Bactrim DS and Keflex for 1 week  Also recommended she use warm compresses and clean the site daily  She was counseled that if there is no improvement in the next 2-3 days and/or she develops fever, malaise, N/V/D, to immediately go to the ER for further evaluation  - sulfamethoxazole-trimethoprim (BACTRIM DS) 800-160 mg per tablet; Take 2 tablets by mouth every 12 (twelve) hours for 7 days  Dispense: 28 tablet; Refill: 0  - cephalexin (KEFLEX) 500 mg capsule; Take 1 capsule (500 mg total) by mouth every 6 (six) hours for 7 days  Dispense: 28 capsule; Refill: 0    2  HSV infection  Cold sore on right upper lip--> refilling Valtrex  - valACYclovir (VALTREX) 500 mg tablet; Take 1 tablet (500 mg total) by mouth 2 (two) times a day as needed (HSV outbreak) for up to 3 days  Dispense: 6 tablet; Refill: 3    No orders of the defined types were placed in this encounter  Follow-up if symptoms worsen or fail to improve, next scheduled follow-up 09/08/2022  Chief Complaint     Chief Complaint   Patient presents with    Earache     Right ear pain, red and noted discharge  Began 2 wks after piercing  Antibiotic course started, went to urgent care Monday  History of Present Illness     Patient is a 24-year-old female with a PMH of depression presents for infection of the right auricle that began approximately 2 weeks ago after an ear piercing    She recently saw Urgent Care on 08/01 and was given a course of Augmentin  Augmentin has not relieved her symptoms and she continues to have pain, redness, swelling, and purulent drainage  She denies any fever, malaise, fatigue, N/V/D  No hearing loss  Her piercing proceeded this started the infection by proximally 3 days  During that period, she did do some aquatic activity in the [facility]  The following portions of the patient's history were reviewed and updated as appropriate: allergies, current medications, past family history, past medical history, past social history, past surgical history and problem list     Review of Systems     Review of Systems   Constitutional: Negative for chills, diaphoresis, fatigue and fever  HENT: Positive for ear discharge, ear pain and mouth sores (Herpes/cold sore)  Negative for facial swelling, hearing loss and sore throat  Eyes: Negative for pain and visual disturbance  Respiratory: Negative for cough and shortness of breath  Cardiovascular: Negative for chest pain and palpitations  Gastrointestinal: Negative for abdominal pain, diarrhea, nausea and vomiting  Genitourinary: Negative for dysuria and hematuria  Musculoskeletal: Negative for arthralgias and back pain  Skin: Negative for color change and rash  Neurological: Negative for seizures and syncope  All other systems reviewed and are negative  Active Problem List     Patient Active Problem List   Diagnosis    Left breast mass    Herpes labialis    Abnormal mammogram    Cysts of both ovaries    Depression, recurrent (HCC)    Chronic nonintractable headache       Objective     Wt 66 7 kg (147 lb)   BMI 24 46 kg/m²     Physical Exam  Vitals reviewed  Constitutional:       General: She is not in acute distress  Appearance: Normal appearance  She is not ill-appearing  HENT:      Head: Normocephalic and atraumatic  Right Ear: No decreased hearing noted  Drainage, swelling and tenderness present        Ears:      Comments: Right external auricle erythematous, slightly swollen and bulging, with purulent drainage on the medial side     Nose: No congestion  Mouth/Throat:      Mouth: Mucous membranes are moist       Pharynx: Oropharynx is clear  Eyes:      General: No scleral icterus  Conjunctiva/sclera: Conjunctivae normal    Cardiovascular:      Rate and Rhythm: Normal rate and regular rhythm  Pulses: Normal pulses  Heart sounds: No murmur heard  No gallop  Pulmonary:      Effort: Pulmonary effort is normal       Breath sounds: Normal breath sounds  Abdominal:      General: Bowel sounds are normal  There is no distension  Palpations: Abdomen is soft  Tenderness: There is no abdominal tenderness  Musculoskeletal:         General: No swelling or tenderness  Cervical back: Neck supple  No muscular tenderness  Right lower leg: No edema  Left lower leg: No edema  Lymphadenopathy:      Cervical: No cervical adenopathy  Skin:     General: Skin is warm  Coloration: Skin is not pale  Findings: No erythema or rash  Neurological:      General: No focal deficit present  Mental Status: She is alert and oriented to person, place, and time  Psychiatric:         Mood and Affect: Mood normal          Behavior: Behavior normal          Thought Content:  Thought content normal          Judgment: Judgment normal                    Pertinent Laboratory/Diagnostic Studies:  CBC: No results found for: WBC, RBC, HGB, HCT, MCV, MCH, MCHC, RDW, MPV, PLT, NRBC, NEUTOPHILPCT, LYMPHOPCT, MONOPCT, EOSPCT, BASOPCT, NEUTROABS, LYMPHSABS, MONOSABS, EOSABS, MONOSABS  Chemistry Profile:   Lab Results   Component Value Date/Time    K 4 0 02/14/2021 08:43 AM     02/14/2021 08:43 AM    CO2 29 02/14/2021 08:43 AM    BUN 14 02/14/2021 08:43 AM    CREATININE 0 77 02/14/2021 08:43 AM    GLUF 84 02/14/2021 08:43 AM    CALCIUM 8 9 02/14/2021 08:43 AM    AST 22 02/14/2021 08:43 AM    ALT 32 02/14/2021 08:43 AM    ALKPHOS 67 02/14/2021 08:43 AM    EGFR 97 02/14/2021 08:43 AM     Coagulation Studies: No results found for: PROTIME, INR, PTT  Cardiac Studies: No results found for: NTBNP, BNP, TROPONINI, POCTROP  Hepatology: No results found for: LIPASE, AMMONIA, AFP  Endocrine Studies:   Lab Results   Component Value Date/Time    TRIG 63 02/14/2021 08:43 AM    CHOLESTEROL 183 02/14/2021 08:43 AM    HDL 95 02/14/2021 08:43 AM    LDLCALC 75 02/14/2021 08:43 AM     Iron Studies: No results found for: LABIRON, IRON, TIBC, FERRITIN  Health Maintenance: No results found for: PSA, HEPCAB  Toxicology: No results found for: AMPHETUR, BARBTUR, BDZUR, COCAINEUR, COCAINEUR, OPIATEUR, PCPUR, THCUR, ETOH, ACTMNPHEN, SALICYLATE  Urine Protein/Creatinine Ratio: No results found for: Shelli Arslan, UTPCR, LABMICR, MICROALBCRE, MICROCREAT  Urinalysis: No results found for: Carbon Levans, SPECGRAV, PHUR, LEUKOCYTESUR, NITRITE, PROTEINUA, GLUCOSEU, KETONESU, BILIRUBINUR, BLOODU   Urine Micro: No results found for: RBCUA, WBCUA, EPIS, BACTERIA, AMORPHPHOS  Urine Dip: No components found for: Felicie Adair, SLAMBSG, [address], [address] [facility], [address] [facility], [address] [facility], [address] [facility], [address] [facility], [address]  Hematology: No results found for: FOLATE, [id number], LDH, IRF, RETICCTPCT, RETIC, RETICHGB  ID Studies:   Lab Results   Component Value Date/Time    HEPBSAG Non-reactive 09/10/2020 12:38 PM     Cultures: No results found for: Lafrances Poot, SPUTUMCULTUR, GRAMSTAIN, URINECX, WOUNDCULT, BODYFLUIDCUL, MRSACULTURE, INFLUAPCR, INFLUBPCR, RSVPCR, LEGIONELLAUR, STPU, CDIFFTOXINB, AFBSTAIN, TISSUECULT, ANAEROBICCUL, FUNGUSCULTUR, CSFCULTURE   Rheumatologic Studies:   Lab Results   Component Value Date/Time    HEPBSAG Non-reactive 09/10/2020 12:38 PM     CSF Studies: No results found for: PROTEINCSF, GRAMSTAIN, GLUCCSF, APPEARCSF, WBCCSF, POLYSCSF, LYMPHSCSF, MONOMACCSF, XANTHOCHROM, ENTEROV, [id number], [id number]  Hypercoagulable Studies: No results found for: FACVLM, AT3ACT, PROTEINC, CRDLPNIGA, CRDLPNIGG, CRDLPNIGM      Current Medications     Current Outpatient Medications:     amitriptyline (ELAVIL) 50 mg tablet, Take 1 tablet (50 mg total) by mouth daily at bedtime, Disp: 30 tablet, Rfl: 1    amoxicillin-clavulanate (AUGMENTIN) 875-125 mg per tablet, Take 1 tablet by mouth every 12 (twelve) hours for 7 days, Disp: 14 tablet, Rfl: 0    ibuprofen (MOTRIN) 800 mg tablet, Take 1 tablet (800 mg total) by mouth daily as needed for headaches, Disp: 15 tablet, Rfl: 0    Magnesium 400 MG TABS, Take 1 tab PO daily for headaches, Disp: 90 tablet, Rfl: 3    Multiple Vitamins-Minerals (MULTIVITAMIN WITH MINERALS) tablet, Take 1 tablet by mouth daily, Disp: , Rfl:     norgestimate-ethinyl estradiol (ORTHO-CYCLEN) 0 25-35 MG-MCG per tablet, Take 1 tablet by mouth daily, Disp: 64 tablet, Rfl: 1    temazepam (RESTORIL) 15 mg capsule, Take 15 mg by mouth daily at bedtime, Disp: , Rfl:     valACYclovir (VALTREX) 500 mg tablet, Take 1 tablet (500 mg total) by mouth 2 (two) times a day as needed (HSV outbreak) for up to 3 days, Disp: 6 tablet, Rfl: 3    rizatriptan (Maxalt) 10 MG tablet, Take 1 tablet (10 mg total) by mouth once as needed for migraine May repeat in 2 hours if needed, Disp: 9 tablet, Rfl: 2    sertraline (ZOLOFT) 50 mg tablet, TAKE 1/2 TABLET BY MOUTH IN THE MORNING X 1 WEEK   THEN TAKE 1 FULL TABLET DAILY IN AM, Disp: , Rfl:     Health Maintenance     Health Maintenance   Topic Date Due    Hepatitis C Screening  Never done    COVID-19 Vaccine (3 - Booster for Pfizer series) 10/06/2021    Influenza Vaccine (1) 09/01/2022    Breast Cancer Screening: Mammogram  01/24/2023    Annual Physical  02/14/2023    BMI: Adult  08/04/2023    Depression Remission PHQ  08/04/2023    Cervical Cancer Screening  07/29/2025    DTaP,Tdap,and Td Vaccines (2 - Td or Tdap) 01/21/2031    HIV Screening  Completed    Pneumococcal Vaccine: Pediatrics (0 to 5 Years) and At-Risk Patients (6 to 59 Years)  Aged Out    HIB Vaccine  Aged Out    Hepatitis B Vaccine  Aged Out    IPV Vaccine  Aged Out    Hepatitis A Vaccine  Aged Out    Meningococcal ACWY Vaccine  Aged Out    HPV Vaccine  Aged [employer] History   Administered Date(s) Administered    COVID-19 PFIZER VACCINE 0 3 ML IM 04/12/2021, 05/06/2021    INFLUENZA 11/15/2014, 12/15/2019, 02/14/2022    Influenza, injectable, quadrivalent, preservative free 0 5 mL 01/21/2021, 02/14/2022    Tdap 01/21/2021       Daniel Coy, DO  Internal Medicine  PGY-3  8/4/2022 3:42 PM

## 2022-08-04 NOTE — TELEPHONE ENCOUNTER
Patient called stating she went to urgent care recently, were she was told she has a right ear infection  Patient was prescribed antibiotics which have not helped  Patient has also been taking her antibiotic incorrectly, taking 2 tablets by mouth twice a day and no improvement  Patient continues to experience pain in her right ear and some swelling  Patient refuses to go back to urgent care or go to the ED  Scheduled patient for an appt with Dr Cassandra Mera for today 8/4/2022 at 4pm  Patient had no questions at this time

## 2022-09-08 ENCOUNTER — OFFICE VISIT (OUTPATIENT)
Dept: INTERNAL MEDICINE CLINIC | Facility: CLINIC | Age: 42
End: 2022-09-08

## 2022-09-08 VITALS
SYSTOLIC BLOOD PRESSURE: 115 MMHG | WEIGHT: 146 LBS | BODY MASS INDEX: 24.32 KG/M2 | DIASTOLIC BLOOD PRESSURE: 76 MMHG | HEART RATE: 76 BPM | TEMPERATURE: 97.9 F | HEIGHT: 65 IN

## 2022-09-08 DIAGNOSIS — H60.11 CELLULITIS OF AURICLE OF RIGHT EAR: Primary | ICD-10-CM

## 2022-09-08 PROCEDURE — 99213 OFFICE O/P EST LOW 20 MIN: CPT | Performed by: INTERNAL MEDICINE

## 2022-09-08 NOTE — PROGRESS NOTES
[facility] Visit Note  Clarissa Villalba 43 y o  female   MRN: [id number]    Assessment and Plan      1  Cellulitis of auricle of right ear  -the patient originally presented to urgent care on 08/01 complaining of an infection of her right auricle around 2 weeks after an ear piercing  At that time she was prescribed a course of Augmentin that did not relieve her symptoms and so she was seen in the clinic on 08/04 and completed a one-week course of Bactrim and Keflex     -today the patient's symptoms have nearly completely resolved  She does still experience mild tenderness and erythema of the right auricle  She denies any fevers or other systemic symptoms     -there is low concern for persistent infection at this time  Her ear appears to be slowly improving  Will instruct the patient to continue monitoring her ear and to contact the clinic if she starts to notice any worsening of her pain, swelling, erythema or purulent drainage  Follow up in our clinic in 6 month(s) fornext scheduled follow-up  Subjective   HISTORY OF PRESENT ILLNESS:  Clarissa Villalba is a 43 y o  female with a past medical history of depression, headaches, ovarian cysts, HSV and right auricular cellulitis who presents to clinic today for a follow-up for right auricular cellulitis  Her symptoms began in July around 2 weeks after receiving a right ear piercing  She went to an urgent care on 08/01 and received a course of Augmentin  Following the course of Augmentin she continued to experience symptoms including pain, redness, swelling and purulent drainage  She was seen in our clinic on 08/04 and at that time received a 1 week course of Keflex and Bactrim  Today the patient states that overall she is feeling well and her symptoms have nearly completely resolved  She does experience a mild tenderness on her right auricle, and her right auricle is mildly erythematous    She denies any recent fevers, chills, chest pain, shortness a breath, nausea, vomiting or diarrhea  Otherwise she had no other acute complaints  Review of Systems   Constitutional: Negative for activity change, appetite change, chills, diaphoresis, fatigue and fever  HENT: Negative for congestion, ear discharge, ear pain, hearing loss, sinus pressure, sinus pain and sore throat  Eyes: Negative for pain, discharge, redness and itching  Respiratory: Negative for cough, chest tightness, shortness of breath and wheezing  Cardiovascular: Negative for chest pain, palpitations and leg swelling  Gastrointestinal: Negative for abdominal distention, abdominal pain, blood in stool, constipation, diarrhea, nausea and vomiting  Genitourinary: Negative for difficulty urinating, dysuria, flank pain and frequency  Musculoskeletal: Negative for arthralgias, back pain and gait problem  Skin: Positive for color change  Negative for pallor and rash  Neurological: Negative for dizziness, weakness, light-headedness, numbness and headaches  Psychiatric/Behavioral: Negative for agitation, behavioral problems, confusion and decreased concentration  Objective     Vitals:    09/08/22 1443   BP: 115/76   BP Location: Right arm   Patient Position: Sitting   Cuff Size: Adult   Pulse: 76   Temp: 97 9 °F (36 6 °C)   TempSrc: Temporal   Weight: 66 2 kg (146 lb)   Height: 5' 5" (1 651 m)     Physical Exam  Constitutional:       Appearance: She is well-developed  HENT:      Head: Normocephalic and atraumatic  Left Ear: External ear normal       Ears:      Comments: Erythema of right or goal     Nose: Nose normal    Eyes:      Conjunctiva/sclera: Conjunctivae normal       Pupils: Pupils are equal, round, and reactive to light  Neck:      Vascular: No JVD  Cardiovascular:      Rate and Rhythm: Normal rate and regular rhythm  Heart sounds: Normal heart sounds  No murmur heard  No friction rub  No gallop  Pulmonary:      Effort: Pulmonary effort is normal  No respiratory distress  Breath sounds: Normal breath sounds  No stridor  No wheezing or rales  Chest:      Chest wall: No tenderness  Abdominal:      General: Bowel sounds are normal  There is no distension  Palpations: Abdomen is soft  There is no mass  Tenderness: There is no abdominal tenderness  There is no guarding or rebound  Hernia: No hernia is present  Musculoskeletal:         General: No tenderness or deformity  Right lower leg: No edema  Left lower leg: No edema  Skin:     General: Skin is warm and dry  Neurological:      Mental Status: She is alert and oriented to person, place, and time  Psychiatric:         Mood and Affect: Mood normal          Behavior: Behavior normal          Thought Content: Thought content normal          Judgment: Judgment normal          History     Current Outpatient Medications:     amitriptyline (ELAVIL) 50 mg tablet, Take 1 tablet (50 mg total) by mouth daily at bedtime, Disp: 30 tablet, Rfl: 1    ibuprofen (MOTRIN) 800 mg tablet, Take 1 tablet (800 mg total) by mouth daily as needed for headaches, Disp: 15 tablet, Rfl: 0    Magnesium 400 MG TABS, Take 1 tab PO daily for headaches, Disp: 90 tablet, Rfl: 3    Multiple Vitamins-Minerals (MULTIVITAMIN WITH MINERALS) tablet, Take 1 tablet by mouth daily, Disp: , Rfl:     norgestimate-ethinyl estradiol (ORTHO-CYCLEN) 0 25-35 MG-MCG per tablet, Take 1 tablet by mouth daily, Disp: 64 tablet, Rfl: 1    rizatriptan (Maxalt) 10 MG tablet, Take 1 tablet (10 mg total) by mouth once as needed for migraine May repeat in 2 hours if needed, Disp: 9 tablet, Rfl: 2    valACYclovir (VALTREX) 500 mg tablet, Take 1 tablet (500 mg total) by mouth 2 (two) times a day as needed (HSV outbreak) for up to 3 days, Disp: 6 tablet, Rfl: 3    sertraline (ZOLOFT) 50 mg tablet, TAKE 1/2 TABLET BY MOUTH IN THE MORNING X 1 WEEK   THEN TAKE 1 FULL TABLET DAILY IN AM (Patient not taking: Reported on 2022), Disp: , Rfl:     temazepam (RESTORIL) 15 mg capsule, Take 15 mg by mouth daily at bedtime (Patient not taking: Reported on 2022), Disp: , Rfl:   No Known Allergies   Past Medical History:   Diagnosis Date    Endometriosis      Past Surgical History:   Procedure Laterality Date     SECTION      , 2006    HYSTERECTOMY N/A 2016    Procedure: EXCISION  SCAR ENDOMETRIOSIS painful nodule;  Surgeon: Jolly Lennox, MD;  Location: [facility];  Service:      Social History     Socioeconomic History    Marital status: /Civil Union     Spouse name: Not on file    Number of children: 2    Years of education: Not on file    Highest education level: Not on file   Occupational History    Occupation: cleaning    Tobacco Use    Smoking status: Never Smoker    Smokeless tobacco: Never Used   Vaping Use    Vaping Use: Never used   Substance and Sexual Activity    Alcohol use: No    Drug use: No    Sexual activity: Yes     Partners: Male     Birth control/protection: OCP   Other Topics Concern    Not on file   Social History Narrative    Not on file     Social Determinants of Health     Financial Resource Strain: Low Risk     Difficulty of Paying Living Expenses: Not hard at all   Food Insecurity: No Food Insecurity    Worried About [address] [employer] in the Last Year: Never true    [address] in the Last Year: Never true   Transportation Needs: No Transportation Needs    Lack of Transportation (Medical): No    Lack of Transportation (Non-Medical): No   Physical Activity: Not on file   Stress: Stress Concern Present    Feeling of Stress :  To some extent   Social Connections: Not on file   Intimate Partner Violence: Not on file   Housing Stability: [address] Unable to Pay for Housing in the Last Year: No    Number of [place] in the Last Year: 1    Unstable Housing in the Last Year: No     Family History   Problem Relation Age of Onset    No Known Problems Mother     No Known Problems Father     No Known Problems Sister     No Known Problems Daughter     No Known Problems Maternal Grandmother     No Known Problems Maternal Grandfather     No Known Problems Paternal Grandmother     No Known Problems Paternal Grandfather     No Known Problems Son     Endometrial cancer Maternal Aunt     Cancer Maternal Aunt     No Known Problems Paternal Aunt     Cancer Paternal Aunt         type unknown    BRCA2 Positive Neg Hx     BRCA2 Negative Neg Hx     BRCA1 Positive Neg Hx     BRCA1 Negative Neg Hx     BRCA 1/2 Neg Hx     Ovarian cancer Neg Hx     Colon cancer Neg Hx     Breast cancer Neg Hx     Breast cancer additional onset Neg Hx        MD Nakia Regan 73 Internal Medicine PGY-3  [facility]  [address] [facility]    PLEASE NOTE:  This encounter was completed utilizing the [employer] Direct Speech Voice Recognition Software  Grammatical errors, random word insertions, pronoun errors and incomplete sentences are occasional consequences of the system due to software limitations, ambient noise and hardware issues  These may be missed by proof reading prior to affixing electronic signature  Any questions or concerns about the content, text or information contained within the body of this dictation should be directly addressed to the physician for clarification  Please do not hesitate to call me directly if you have any any questions or concerns

## 2022-09-09 ENCOUNTER — TELEPHONE (OUTPATIENT)
Dept: PSYCHIATRY | Facility: CLINIC | Age: 42
End: 2022-09-09

## 2022-09-09 NOTE — TELEPHONE ENCOUNTER
contacted patient off med mgmt waitlist to verify needs of services in attempts to update list  lvm for patient to contact intake dept

## 2023-03-24 ENCOUNTER — HOSPITAL ENCOUNTER (OUTPATIENT)
Dept: MAMMOGRAPHY | Facility: CLINIC | Age: 43
Discharge: HOME | End: 2023-03-24

## 2023-03-24 VITALS — WEIGHT: 144 LBS | BODY MASS INDEX: 23.99 KG/M2 | HEIGHT: 65 IN

## 2023-03-24 DIAGNOSIS — Z13.9 SCREENING DUE: ICD-10-CM

## 2023-03-24 DIAGNOSIS — Z12.31 ENCOUNTER FOR SCREENING MAMMOGRAM FOR MALIGNANT NEOPLASM OF BREAST: ICD-10-CM

## 2023-05-07 ENCOUNTER — APPOINTMENT (EMERGENCY)
Dept: RADIOLOGY | Facility: HOSPITAL | Age: 43
End: 2023-05-07

## 2023-05-07 ENCOUNTER — HOSPITAL ENCOUNTER (EMERGENCY)
Facility: HOSPITAL | Age: 43
Discharge: HOME | End: 2023-05-07
Attending: EMERGENCY MEDICINE

## 2023-05-07 VITALS
RESPIRATION RATE: 20 BRPM | SYSTOLIC BLOOD PRESSURE: 131 MMHG | HEART RATE: 76 BPM | DIASTOLIC BLOOD PRESSURE: 89 MMHG | OXYGEN SATURATION: 100 % | TEMPERATURE: 97.7 F
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
## 2023-05-07 DIAGNOSIS — S06.0XAA CONCUSSION: ICD-10-CM

## 2023-05-07 DIAGNOSIS — M25.519 SHOULDER PAIN: ICD-10-CM

## 2023-05-07 DIAGNOSIS — S46.819A TRAPEZIUS MUSCLE STRAIN: ICD-10-CM

## 2023-05-07 LAB
ATRIAL RATE: 77 BPM
GLUCOSE SERPL-MCNC: 85 MG/DL (ref 65–140)
HCG SERPL QL: NEGATIVE
P AXIS: 87 DEGREES
PR INTERVAL: 138 MS
QRS AXIS: 76 DEGREES
QRSD INTERVAL: 66 MS
QT INTERVAL: 376 MS
QTC INTERVAL: 425 MS
T WAVE AXIS: 62 DEGREES
VENTRICULAR RATE: 77 BPM

## 2023-05-07 RX ORDER — METHOCARBAMOL 500 MG/1
500 TABLET, FILM COATED ORAL ONCE
Status: COMPLETED | OUTPATIENT
Start: 2023-05-07 | End: 2023-05-07

## 2023-05-07 RX ORDER — KETOROLAC TROMETHAMINE 30 MG/ML
15 INJECTION, SOLUTION INTRAMUSCULAR; INTRAVENOUS ONCE
Status: COMPLETED | OUTPATIENT
Start: 2023-05-07 | End: 2023-05-07

## 2023-05-07 RX ORDER — ACETAMINOPHEN 325 MG/1
650 TABLET ORAL ONCE
Status: COMPLETED | OUTPATIENT
Start: 2023-05-07 | End: 2023-05-07

## 2023-05-07 RX ORDER — METHOCARBAMOL 500 MG/1
500 TABLET, FILM COATED ORAL 2 TIMES DAILY
Qty: 20 TABLET | Refills: 0 | Status: SHIPPED | OUTPATIENT
Start: 2023-05-07

## 2023-05-07 RX ORDER — NAPROXEN 500 MG/1
500 TABLET ORAL 2 TIMES DAILY WITH MEALS
Qty: 30 TABLET | Refills: 0 | Status: SHIPPED | OUTPATIENT
Start: 2023-05-07

## 2023-05-07 RX ADMIN — ACETAMINOPHEN 650 MG: 325 TABLET ORAL at 12:31

## 2023-05-07 RX ADMIN — METHOCARBAMOL 500 MG: 500 TABLET ORAL at 11:04

## 2023-05-07 RX ADMIN — KETOROLAC TROMETHAMINE 15 MG: 30 INJECTION, SOLUTION INTRAMUSCULAR; INTRAVENOUS at 11:04

## 2023-05-07 NOTE — Clinical Note
Suma Cuevas was seen and treated in our emergency department on 5/7/2023  Diagnosis:     Hismaylla    She may return on this date: 05/10/2023         If you have any questions or concerns, please don't hesitate to call        Aldo Tay MD    ______________________________           _______________          _______________  Hospital Representative                              Date                                Time Previously Negative (within the last year)

## 2023-05-07 NOTE — ED NOTES
Sling applied to pts left arm, and readjusted to patients comfort        Vince Levien, PASQUALE  05/07/23 9853
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
## 2023-05-09 ENCOUNTER — EVALUATION (OUTPATIENT)
Dept: PHYSICAL THERAPY | Facility: CLINIC | Age: 43
End: 2023-05-09

## 2023-05-09 DIAGNOSIS — S06.0X0D CONCUSSION WITHOUT LOSS OF CONSCIOUSNESS, SUBSEQUENT ENCOUNTER: ICD-10-CM

## 2023-05-09 LAB
DME PARACHUTE DELIVERY DATE ACTUAL: NORMAL
DME PARACHUTE DELIVERY DATE REQUESTED: NORMAL
DME PARACHUTE ITEM DESCRIPTION: NORMAL
DME PARACHUTE ORDER STATUS: NORMAL
DME PARACHUTE SUPPLIER NAME: NORMAL
DME PARACHUTE SUPPLIER PHONE: NORMAL
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
## 2023-05-12 ENCOUNTER — OFFICE VISIT (OUTPATIENT)
Dept: PHYSICAL THERAPY | Facility: CLINIC | Age: 43
End: 2023-05-12

## 2023-05-12 DIAGNOSIS — S06.0X0D CONCUSSION WITHOUT LOSS OF CONSCIOUSNESS, SUBSEQUENT ENCOUNTER: Primary | ICD-10-CM
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
## 2023-05-15 ENCOUNTER — APPOINTMENT (OUTPATIENT)
Dept: PHYSICAL THERAPY | Facility: CLINIC | Age: 43
End: 2023-05-15
Payer: COMMERCIAL

## 2023-05-16 ENCOUNTER — OFFICE VISIT (OUTPATIENT)
Dept: PHYSICAL THERAPY | Facility: CLINIC | Age: 43
End: 2023-05-16

## 2023-05-16 DIAGNOSIS — S06.0X0D CONCUSSION WITHOUT LOSS OF CONSCIOUSNESS, SUBSEQUENT ENCOUNTER: Primary | ICD-10-CM

## 2023-05-16 NOTE — PROGRESS NOTES
Daily Note     Today's date: 2023  Patient name: Berta Vázquez  : 1980  MRN: [id number]  Referring provider: Jennifer Younger MD  Dx:   Encounter Diagnosis     ICD-10-CM    1  Concussion without loss of consciousness, subsequent encounter  S06 0X0D                      Subjective: Hismaylla states that her shoulder is the only thing bothering her is her shoulder  She states it feels like a pinching, pain 1-2/10  She reports she has not returned to the gym but wants to  She reports that at the gym she primarily does deadlifts, back squats, and chest press, doesn't do any cleans or much overhead movements unless she is doing crossfit  Objective: See treatment diary below      Assessment: Irritation with shoulder pain along upper trap, supraspinatus, and biceps with overhead movements, and worst with horizontal adduction movements  Decreased pain with therapist assisting movement at scapula to decrease scapular elevation and anterior tipping  Added prone scapular stabilization exercises during session with emphasis on scapular positioning with decreased pinching at front of shoulder  Added movements of deadlifting, barbell squats, and some rotational movement (to incorporate horiztonal adduction) with minimal irritation of the shoulder  Gave green TB for home to perform no money  Required cues during prone exercises and deadlifts for neutral neck positioning and decreased upper trap activation  Continue to progress as tolerated  Plan: Continue per plan of care  Progress treatment as tolerated         Short Term Goal Expiration Date:(2023)  Long Term Goal Expiration Date: (2023)  POC Expiration Date: (2023)        Precautions concussion (23)       Manuals      STM L UT  5 min IASTM x 5 minutes  MFR to L upper trap, lev scap, and supraspinatus     SubO release                        Neuro Re-Ed         Pt education Concussion recovery; return to normal routine "as able within symptom tolerance; light aerobic exercise; education hand out--8 mins                                                       Ther Ex        UT/levator stretch 2x20\" ea 2x30\" ea 30\" x 3 on L only, each stretch     scap retraction 3\" hold x10       C/s retraction Supine 2\" hold x10       Shoulder staci 4-way  Flex, ext, abd, ER    2\" hold x10 ea      Supine shoulder AAROM        Table slides        C/s SNAGs  Ext and rotation     2\" hold x10 ea      TB rows/ext  Blue TB x20 ea      Bicep curls   Tidal tank x 20 reps 10#     deadlift   5# DB bilaterally x 10, 5# weighted bar x 10 reps, x 10# tidal tank x 10 reps     OH press with weighted bar   5# weighted bar, clean to OH     Tidal tank twist   10# tidal tank x 10 each     Back squat   With 5# weighted bar behind x 10 reps     \"no money\"   With green TB x 20 reps     prone   I's with 5 sec hold x 15 reps    T's with 5 sec hold x 15    Y's x 15 reps     [place] concussion treadmill test  performed      Ther Activity                        Gait Training                        Modalities                          Access Code: [email or web address]  URL: [email or web address]  Date: 05/09/2023  Prepared by: Maria Alejandra Staley    Exercises  - Seated Scapular Retraction  - 2-3 x daily - 7 x weekly - 2-3 sets - 10 reps - 5 hold  - Seated Upper Trapezius Stretch  - 2-3 x daily - 7 x weekly - 2-3 sets - 30 hold  - Seated Levator Scapulae Stretch  - 2-3 x daily - 7 x weekly - 2-3 sets - 30 hold  - Supine Chin Tuck  - 2-3 x daily - 7 x weekly - 2-3 sets - 10 reps - 2 hold            "

## 2023-05-17 ENCOUNTER — OFFICE VISIT (OUTPATIENT)
Dept: PHYSICAL THERAPY | Facility: CLINIC | Age: 43
End: 2023-05-17

## 2023-05-17 DIAGNOSIS — S06.0X0D CONCUSSION WITHOUT LOSS OF CONSCIOUSNESS, SUBSEQUENT ENCOUNTER: Primary | ICD-10-CM

## 2023-05-17 NOTE — PROGRESS NOTES
Daily Note     Today's date: 2023  Patient name: Gianluca Kelly  : 1980  MRN: [id number]  Referring provider: Rosa Camarena MD  Dx:   Encounter Diagnosis     ICD-10-CM    1  Concussion without loss of consciousness, subsequent encounter  S06 0X0D                      Subjective: Jeanna states that her shoulder feels about the same  She continues to have anterior shoulder pain which happens when she is at work  She started back on Monday  She goes to see her MD tomorrow and plans to discuss the shoulder  Denies any headaches recently  Objective: See treatment diary below      Assessment: Jeanna tolerated treatment well  Continued to progress shoulder and RTC muscle activation to isotonics with light resistance  She reported intermittent minor pain throughout with no worsening with repetition  She exhibited gradually improving scapular motor control  No pain with lifting or carrying a box even with the isometric biceps hold  No change to shoulder symptoms following IASTM to anterior shoulder or long head of the biceps  Encouraged her to discuss with MD about shoulder and ongoing pain  Discussed possible transfer to orthopedic clinic to address shoulder  Educated her to continue with gentle loading of shoulder, RTC, and scapular muscles to progress tensile load as MMT on IE did not indicate any tear of a muscle just irritation  Discussed application of ice as needed to address inflammation or temporary to address pain  Plan: Continue per plan of care  Progress treatment as tolerated         Short Term Goal Expiration Date:(2023)  Long Term Goal Expiration Date: (2023)  POC Expiration Date: (2023)        Precautions concussion (23)       Manuals     STM L UT  5 min IASTM x 5 minutes  MFR to L upper trap, lev scap, and supraspinatus IASTM  x8 mins    Anterior shoulder; transverse friction of long head of biceps    SubO release "  Neuro Re-Ed         Pt education Concussion recovery; return to normal routine as able within symptom tolerance; light aerobic exercise; education hand out--8 mins                                                       Ther Ex        UT/levator stretch 2x20\" ea 2x30\" ea 30\" x 3 on L only, each stretch 2x30\" ea    scap retraction 3\" hold x10       C/s retraction Supine 2\" hold x10       Shoulder staci 4-way  Flex, ext, abd, ER    2\" hold x10 ea  Isotonics    2# DB 2x10 flexion (minor pain)    3# DB 2x10 ABD (minor pain)    2# DB, SL ER 2x10    Pink TB horizontal ABD 2x10 (minor pain)    Supine shoulder AAROM        Table slides        C/s SNAGs  Ext and rotation     2\" hold x10 ea      TB rows/ext  Blue TB x20 ea      Bicep curls   Tidal tank x 20 reps 10# 3# L DB only 2x10 (minor pain)    deadlift   5# DB bilaterally x 10, 5# weighted bar x 10 reps, x 10# tidal tank x 10 reps     OH press with weighted bar   5# weighted bar, clean to OH     Tidal tank twist   10# tidal tank x 10 each     Back squat   With 5# weighted bar behind x 10 reps     \"no money\"   With green TB x 20 reps With pink TB 5\" hold x20 reps    prone   I's with 5 sec hold x 15 reps    T's with 5 sec hold x 15    Y's x 15 reps I's with 2\" hold x20 reps    T's with 2\" hold x20 reps    Y's with 2\" hold x20 reps      [place] concussion treadmill test  performed      UBE retro    5 mins for postural muscle activation    Box carries    10# box, lift from floor, walk 10 ft, place on floor     x4             Ther Activity                        Gait Training                        Modalities                          Access Code: [email or web address]  URL: [email or web address]  Date: 05/09/2023  Prepared by: Maria Alejandra Staley    Exercises  - Seated Scapular Retraction  - 2-3 x daily - 7 x weekly - 2-3 sets - 10 reps - 5 hold  - Seated Upper Trapezius Stretch  - 2-3 x daily - 7 x weekly - 2-3 sets - 30 hold  - Seated Levator Scapulae Stretch  - 2-3 x " daily - 7 x weekly - 2-3 sets - 30 hold  - Supine Chin Tuck  - 2-3 x daily - 7 x weekly - 2-3 sets - 10 reps - 2 hold

## 2023-05-22 ENCOUNTER — OFFICE VISIT (OUTPATIENT)
Dept: PHYSICAL THERAPY | Facility: CLINIC | Age: 43
End: 2023-05-22

## 2023-05-22 ENCOUNTER — TELEPHONE (OUTPATIENT)
Dept: INTERNAL MEDICINE CLINIC | Facility: CLINIC | Age: 43
End: 2023-05-22

## 2023-05-22 DIAGNOSIS — S06.0X0D CONCUSSION WITHOUT LOSS OF CONSCIOUSNESS, SUBSEQUENT ENCOUNTER: Primary | ICD-10-CM

## 2023-05-22 NOTE — TELEPHONE ENCOUNTER
Folder Color-  Blue    Name of Form- PT plan of care     Form to be filled out by- Dr Puja Galvez    Form to be Faxed [phone]    Patient made aware of 10 business day policy
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
## 2023-05-24 ENCOUNTER — APPOINTMENT (OUTPATIENT)
Dept: PHYSICAL THERAPY | Facility: CLINIC | Age: 43
End: 2023-05-24
Payer: COMMERCIAL

## 2023-05-31 ENCOUNTER — APPOINTMENT (OUTPATIENT)
Dept: PHYSICAL THERAPY | Facility: CLINIC | Age: 43
End: 2023-05-31
Payer: COMMERCIAL

## 2023-06-13 ENCOUNTER — TELEPHONE (OUTPATIENT)
Dept: INTERNAL MEDICINE CLINIC | Facility: CLINIC | Age: 43
End: 2023-06-13

## 2023-10-04 ENCOUNTER — ANNUAL EXAM (OUTPATIENT)
Dept: OBGYN CLINIC | Facility: CLINIC | Age: 43
End: 2023-10-04

## 2023-10-04 ENCOUNTER — PATIENT OUTREACH (OUTPATIENT)
Dept: OBGYN CLINIC | Facility: CLINIC | Age: 43
End: 2023-10-04

## 2023-10-04 VITALS
BODY MASS INDEX: 23.76 KG/M2 | HEIGHT: 65 IN | WEIGHT: 142.6 LBS | HEART RATE: 76 BPM | DIASTOLIC BLOOD PRESSURE: 74 MMHG | SYSTOLIC BLOOD PRESSURE: 124 MMHG

## 2023-10-04 DIAGNOSIS — Z13.31 POSITIVE DEPRESSION SCREENING: ICD-10-CM

## 2023-10-04 DIAGNOSIS — N80.9 ENDOMETRIOSIS: ICD-10-CM

## 2023-10-04 DIAGNOSIS — Z12.4 CERVICAL CANCER SCREENING: ICD-10-CM

## 2023-10-04 DIAGNOSIS — Z12.31 ENCOUNTER FOR SCREENING MAMMOGRAM FOR MALIGNANT NEOPLASM OF BREAST: ICD-10-CM

## 2023-10-04 DIAGNOSIS — Z11.3 SCREENING EXAMINATION FOR STD (SEXUALLY TRANSMITTED DISEASE): Primary | ICD-10-CM

## 2023-10-04 DIAGNOSIS — Z01.419 WOMEN'S ANNUAL ROUTINE GYNECOLOGICAL EXAMINATION: ICD-10-CM

## 2023-10-04 PROCEDURE — 87624 HPV HI-RISK TYP POOLED RSLT: CPT | Performed by: NURSE PRACTITIONER

## 2023-10-04 PROCEDURE — 87491 CHLMYD TRACH DNA AMP PROBE: CPT | Performed by: NURSE PRACTITIONER

## 2023-10-04 PROCEDURE — 87591 N.GONORRHOEAE DNA AMP PROB: CPT | Performed by: NURSE PRACTITIONER

## 2023-10-04 PROCEDURE — 88175 CYTOPATH C/V AUTO FLUID REDO: CPT | Performed by: NURSE PRACTITIONER

## 2023-10-04 PROCEDURE — S0612 ANNUAL GYNECOLOGICAL EXAMINA: HCPCS | Performed by: NURSE PRACTITIONER

## 2023-10-04 NOTE — PROGRESS NOTES
ANNUAL GYNECOLOGICAL EXAMINATION    Sean Quispe is a 37 y.o. female who presents today for annual GYN exam.  Her last pap smear was performed 2020 and result was ASCUS, HR-HPV negative. She reports no other history of abnormal pap smears in her past.  Her last mammogram was performed 3/2023 and result was BI-RADS 2. She reports menses as absent with OCP use. She receives her OCPs from [facility] and takes them continuously for control of endometriosis pain. No LMP recorded. (Menstrual status: Birth Control). Her general medical history has been reviewed and she reports it as follows:    Past Medical History:   Diagnosis Date   • Endometriosis      Past Surgical History:   Procedure Laterality Date   •  SECTION      ,    • HYSTERECTOMY N/A 2016    Procedure: EXCISION  SCAR ENDOMETRIOSIS painful nodule;  Surgeon: Lamin Mohr MD;  Location: [facility];  Service:      OB History        2    Para   2    Term   2            AB        Living   2       SAB        IAB        Ectopic        Multiple        Live Births                   Social History     Tobacco Use   • Smoking status: Never   • Smokeless tobacco: Never   Vaping Use   • Vaping Use: Never used   Substance Use Topics   • Alcohol use: No   • Drug use: No     Social History     Substance and Sexual Activity   Sexual Activity Yes   • Partners: Male   • Birth control/protection: OCP     Cancer-related family history includes Cancer in her maternal aunt and paternal aunt; Endometrial cancer in her maternal aunt. There is no history of Ovarian cancer, Colon cancer, or Breast cancer.     Current Outpatient Medications   Medication Instructions   • amitriptyline (ELAVIL) 50 mg, Oral, Daily at bedtime   • ibuprofen (MOTRIN) 800 mg, Oral, Daily PRN   • Magnesium 400 MG TABS Take 1 tab PO daily for headaches   • methocarbamol (ROBAXIN) 500 mg, Oral, 2 times daily   • Multiple Vitamins-Minerals (MULTIVITAMIN WITH MINERALS) tablet 1 tablet, Oral, Daily   • naproxen (NAPROSYN) 500 mg, Oral, 2 times daily with meals   • norgestimate-ethinyl estradiol (ORTHO-CYCLEN) 0.25-35 MG-MCG per tablet 1 tablet, Oral, Daily   • rizatriptan (MAXALT) 10 mg, Oral, Once as needed, May repeat in 2 hours if needed   • sertraline (ZOLOFT) 50 mg tablet TAKE 1/2 TABLET BY MOUTH IN THE MORNING X 1 WEEK. THEN TAKE 1 FULL TABLET DAILY IN AM   • temazepam (RESTORIL) 15 mg, Daily at bedtime   • valACYclovir (VALTREX) 500 mg, Oral, 2 times daily PRN       Review of Systems:  Review of Systems   Constitutional: Negative. Gastrointestinal: Negative. Genitourinary: Negative. Skin: Negative. Physical Exam:  /74 (BP Location: Right arm)   Pulse 76   Ht 5' 5" (1.651 m)   Wt 64.7 kg (142 lb 9.6 oz)   BMI 23.73 kg/m²   Physical Exam  Constitutional:       General: She is not in acute distress. Appearance: Normal appearance. Genitourinary:      Vulva and bladder normal.      No lesions in the vagina. No vaginal erythema or ulceration. Right Adnexa: not tender and no mass present. Left Adnexa: not tender and no mass present. No cervical motion tenderness or lesion. Uterus is not enlarged or tender. No uterine mass detected. Breasts:     Right: No mass, nipple discharge or skin change. Left: No mass, nipple discharge or skin change. Cardiovascular:      Rate and Rhythm: Normal rate and regular rhythm. Pulmonary:      Effort: Pulmonary effort is normal.      Breath sounds: Normal breath sounds. Abdominal:      General: Abdomen is flat. Palpations: Abdomen is soft. Musculoskeletal:      Cervical back: Neck supple. Neurological:      Mental Status: She is alert. Skin:     General: Skin is warm and dry. Psychiatric:         Mood and Affect: Mood normal.         Behavior: Behavior normal.   Vitals reviewed. Assessment/Plan:   1.  Normal well-woman GYN exam.   2. Cervical cancer screening:  Normal cervical exam.  Pap smear done with HPV co-testing. 3. STD screening:  Orders placed for vaginal GC/CT cultures. 4. Breast cancer screening:  Normal breast exam.  Order placed for bilateral screening mammogram.  Reviewed breast self-awareness. 5. Depression Screening: Patient's depression screening was assessed with a PHQ-9 score was 10. Continue regular follow-up with their psychologist/therapist/psychiatrist who is managing their mental health condition(s). Referral placed for  consultation. 6. BMI Counseling: Body mass index is 23.73 kg/m². Discussed the patient's BMI with her. The BMI is normal.    7. Contraception:  Doing well on OCPs. 8. She is requesting an order for pelvic US due to history of endometriosis that she has had to have surgically excised multiple times. She reports pain is well controlled at this time. Reviewed with patient that [facility], but that they will not necessarily be reviewed by me immediately. Explained that I will review results at my earliest opportunity and contact patient appropriately.

## 2023-10-06 LAB
C TRACH DNA SPEC QL NAA+PROBE: NEGATIVE
HPV HR 12 DNA CVX QL NAA+PROBE: NEGATIVE
HPV16 DNA CVX QL NAA+PROBE: NEGATIVE
HPV18 DNA CVX QL NAA+PROBE: NEGATIVE
N GONORRHOEA DNA SPEC QL NAA+PROBE: NEGATIVE

## 2023-10-12 ENCOUNTER — TELEPHONE (OUTPATIENT)
Dept: OBGYN CLINIC | Facility: CLINIC | Age: 43
End: 2023-10-12

## 2023-10-12 LAB
LAB AP GYN PRIMARY INTERPRETATION: NORMAL
Lab: NORMAL

## 2023-10-12 NOTE — TELEPHONE ENCOUNTER
----- Message from Kaia Friday, [address] [facility] sent at 10/12/2023  9:26 AM EDT -----  Please let her know the pap and sti testing is negative. Thank you!

## 2023-10-20 ENCOUNTER — PATIENT OUTREACH (OUTPATIENT)
Dept: OBGYN CLINIC | Facility: CLINIC | Age: 43
End: 2023-10-20

## 2023-10-20 NOTE — PROGRESS NOTES
Chart review indicates that a referral was placed 10/4 to follow up with patient regarding positive depression screen. PHQ-9 score 10 with SI. [facility] did attempt to outreach pt 10/4, LVM. Prior notes indicate that [facility] was involved in 2022 regarding pt depression and SW had assisted her with connection to OP [facility]. [facility] outreached pt again today to address depression and confirm connection to [facility], she did not answer. VM left. CM to follow.

## 2023-11-01 ENCOUNTER — PATIENT OUTREACH (OUTPATIENT)
Dept: OBGYN CLINIC | Facility: CLINIC | Age: 43
End: 2023-11-01

## 2023-11-03 ENCOUNTER — HOSPITAL ENCOUNTER (OUTPATIENT)
Dept: RADIOLOGY | Facility: HOSPITAL | Age: 43
Discharge: HOME | End: 2023-11-03
Payer: COMMERCIAL

## 2023-11-03 DIAGNOSIS — N80.9 ENDOMETRIOSIS: ICD-10-CM

## 2023-11-03 PROCEDURE — 76856 US EXAM PELVIC COMPLETE: CPT

## 2023-11-03 PROCEDURE — 76830 TRANSVAGINAL US NON-OB: CPT

## 2023-11-16 ENCOUNTER — TELEPHONE (OUTPATIENT)
Dept: OBGYN CLINIC | Facility: MEDICAL CENTER | Age: 43
End: 2023-11-16

## 2023-11-16 NOTE — TELEPHONE ENCOUNTER
Kiet advising that we need the imaging disc and report for her shoulder mri done through [facility]- if she does not have it then she needs to contact  radiology dpt. [phone] and req for them to release the mri to [facility]

## 2023-11-17 NOTE — TELEPHONE ENCOUNTER
Caller: Tyrell    Doctor: Norma Skaggs    Reason for call: Patient called regarding MRI. She verified LV radiology # and stated she will call to request MRI release to [facility].     Call back#: n/a

## 2023-11-22 ENCOUNTER — OFFICE VISIT (OUTPATIENT)
Dept: OBGYN CLINIC | Facility: OTHER | Age: 43
End: 2023-11-22
Payer: COMMERCIAL

## 2023-11-22 VITALS
HEIGHT: 65 IN | WEIGHT: 142 LBS | HEART RATE: 74 BPM | SYSTOLIC BLOOD PRESSURE: 128 MMHG | BODY MASS INDEX: 23.66 KG/M2 | DIASTOLIC BLOOD PRESSURE: 82 MMHG

## 2023-11-22 DIAGNOSIS — M25.312 DYSKINESIS OF LEFT SCAPULA: Primary | ICD-10-CM

## 2023-11-22 DIAGNOSIS — M89.512 OSTEOLYSIS OF ACROMIAL END OF LEFT CLAVICLE: ICD-10-CM

## 2023-11-22 PROCEDURE — 99203 OFFICE O/P NEW LOW 30 MIN: CPT | Performed by: ORTHOPAEDIC SURGERY
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
## 2024-01-07 DIAGNOSIS — B00.9 HSV INFECTION: ICD-10-CM

## 2024-01-09 RX ORDER — VALACYCLOVIR HYDROCHLORIDE 500 MG/1
500 TABLET, FILM COATED ORAL 2 TIMES DAILY PRN
Qty: 6 TABLET | Refills: 3 | Status: SHIPPED | OUTPATIENT
Start: 2024-01-09 | End: 2024-01-21

## 2024-02-28 ENCOUNTER — OFFICE VISIT (OUTPATIENT)
Dept: OBGYN CLINIC | Facility: OTHER | Age: 44
End: 2024-02-28
Payer: COMMERCIAL

## 2024-02-28 VITALS
DIASTOLIC BLOOD PRESSURE: 79 MMHG | HEIGHT: 65 IN | BODY MASS INDEX: 24.53 KG/M2 | WEIGHT: 147.2 LBS | HEART RATE: 77 BPM | SYSTOLIC BLOOD PRESSURE: 118 MMHG

## 2024-02-28 DIAGNOSIS — M25.312 DYSKINESIS OF LEFT SCAPULA: ICD-10-CM

## 2024-02-28 DIAGNOSIS — M89.512 OSTEOLYSIS OF ACROMIAL END OF LEFT CLAVICLE: Primary | ICD-10-CM

## 2024-02-28 PROCEDURE — 99212 OFFICE O/P EST SF 10 MIN: CPT | Performed by: ORTHOPAEDIC SURGERY

## 2024-02-28 NOTE — PROGRESS NOTES
"Orthopaedic Surgery - Office Note  Jeanna Bell (43 y.o. female)   : 1980   MRN: [id number]  Encounter Date: 2024    Chief Complaint   Patient presents with    Left Shoulder - Follow-up       Assessment / Plan  Left scapular dyskinesia and osteolysis clavicle     {[facility] PLAN:23296}  No follow-ups on file.    History of Present Illness  Jeanna Bell is a 43 y.o. female who presents for follow up left scapular dyskinesia and osteolysis clavicle     Review of Systems  Pertinent items are noted in HPI.  All other systems were reviewed and are negative.    Physical Exam  /79   Pulse 77   Ht 5' 5\" (1.651 m)   Wt 66.8 kg (147 lb 3.2 oz)   BMI 24.50 kg/m²   Cons: Appears well.  No apparent distress.  Psych: Alert. Oriented x3.  Mood and affect normal.  Eyes: PERRLA, EOMI  Resp: Normal effort.  No audible wheezing or stridor.  CV: Palpable pulse.  No discernable arrhythmia.  No LE edema.  Lymph:  No palpable cervical, axillary, or inguinal lymphadenopathy.  Skin: Warm.  No palpable masses.  No visible lesions.  Neuro: Normal muscle tone.  Normal and symmetric DTR's.     Left Shoulder Exam  Alignment / Posture:  Normal shoulder posture.  Inspection:  No swelling. No ecchymosis.  Palpation:  {PE PALPATION:03452}  ROM:  Shoulder FE ***. Shoulder ER ***. Shoulder IR ***.  Strength:  {PE STRENGTH UPPER:91013}  Stability:  {PE STABILITY SHOULDER:69880}  Tests: {PE TESTS SHOULDER:44701}  Neurovascular:  Sensation intact in Ax/R/M/U nerve distributions. 2+ radial pulse.     Studies Reviewed  I have personally reviewed pertinent films in PACS.  MRI of left shoulder - clavicular osteolysis with no evidence of fracture or dislocation. Possible labral tear, not clearly visible     Procedures  No procedures today.    Medical, Surgical, Family, and Social History  The patient's medical history, family history, and social history, were reviewed and updated as appropriate.    Past Medical History:   Diagnosis " Date    Endometriosis        Past Surgical History:   Procedure Laterality Date     SECTION      , 2006    HYSTERECTOMY N/A 2016    Procedure: EXCISION  SCAR ENDOMETRIOSIS painful nodule;  Surgeon: Kiran Mccallum MD;  Location: [facility];  Service:        Family History   Problem Relation Age of Onset    No Known Problems Mother     No Known Problems Father     No Known Problems Sister     No Known Problems Daughter     No Known Problems Maternal Grandmother     No Known Problems Maternal Grandfather     No Known Problems Paternal Grandmother     No Known Problems Paternal Grandfather     No Known Problems Son     Endometrial cancer Maternal Aunt     Cancer Maternal Aunt     No Known Problems Paternal Aunt     Cancer Paternal Aunt         type unknown    BRCA2 Positive Neg Hx     BRCA2 Negative Neg Hx     BRCA1 Positive Neg Hx     BRCA1 Negative Neg Hx     BRCA 1/2 Neg Hx     Ovarian cancer Neg Hx     Colon cancer Neg Hx     Breast cancer Neg Hx     Breast cancer additional onset Neg Hx        Social History     Occupational History    Occupation: cleaning    Tobacco Use    Smoking status: Never    Smokeless tobacco: Never   Vaping Use    Vaping status: Never Used   Substance and Sexual Activity    Alcohol use: No    Drug use: No    Sexual activity: Yes     Partners: Male     Birth control/protection: OCP       No Known Allergies      Current Outpatient Medications:     Magnesium 400 MG TABS, Take 1 tab PO daily for headaches, Disp: 90 tablet, Rfl: 3    Multiple Vitamins-Minerals (MULTIVITAMIN WITH MINERALS) tablet, Take 1 tablet by mouth daily, Disp: , Rfl:     norgestimate-ethinyl estradiol (ORTHO-CYCLEN) 0.25-35 MG-MCG per tablet, Take 1 tablet by mouth daily, Disp: 64 tablet, Rfl: 1    amitriptyline (ELAVIL) 50 mg tablet, Take 1 tablet (50 mg total) by mouth daily at bedtime, Disp: 30 tablet, Rfl: 1    ibuprofen (MOTRIN) 800 mg tablet, Take 1 tablet (800 mg total) by mouth daily  as needed for headaches (Patient not taking: Reported on 10/4/2023), Disp: 15 tablet, Rfl: 0    methocarbamol (ROBAXIN) 500 mg tablet, Take 1 tablet (500 mg total) by mouth 2 (two) times a day (Patient not taking: Reported on 10/4/2023), Disp: 20 tablet, Rfl: 0    naproxen (Naprosyn) 500 mg tablet, Take 1 tablet (500 mg total) by mouth 2 (two) times a day with meals (Patient not taking: Reported on 10/4/2023), Disp: 30 tablet, Rfl: 0    rizatriptan (Maxalt) 10 MG tablet, Take 1 tablet (10 mg total) by mouth once as needed for migraine May repeat in 2 hours if needed, Disp: 9 tablet, Rfl: 2    sertraline (ZOLOFT) 50 mg tablet, TAKE 1/2 TABLET BY MOUTH IN THE MORNING X 1 WEEK. THEN TAKE 1 FULL TABLET DAILY IN AM (Patient not taking: Reported on 9/8/2022), Disp: , Rfl:     temazepam (RESTORIL) 15 mg capsule, Take 15 mg by mouth daily at bedtime (Patient not taking: Reported on 9/8/2022), Disp: , Rfl:     valACYclovir (VALTREX) 500 mg tablet, Take 1 tablet (500 mg total) by mouth 2 (two) times a day as needed (HSV outbreak) for up to 12 days, Disp: 6 tablet, Rfl: 3      Jodie Arce    Scribe Attestation      I,:   am acting as a scribe while in the presence of the attending physician.:       I,:   personally performed the services described in this documentation    as scribed in my presence.:

## 2024-02-28 NOTE — PROGRESS NOTES
"Orthopaedic Surgery - Office Note  Jeanna Bell (43 y.o. female)   : 1980   MRN: [id number]  Encounter Date: 2024    Chief Complaint   Patient presents with    Left Shoulder - Follow-up       Assessment / Plan  Right shoulder osteolysis of left clavicle, with improved scapular dyskinesis on exam.     Continue HEP for shoulder, scapular stabilizers, and rotator cuff strengthening and ROM.   Ice, heat, OTC anti-inflammatories for pain PRN.  Return if symptoms worsen or fail to improve.    History of Present Illness  Jeanna Bell is a 43 y.o. female who presents for follow-up of scapular dyskinesis and osteolysis of left clavicle. Overall, she feels improvement with her HEP she performs at the gym. She reports her clavicle feels more \"in line\" to her. She reports pain in the anterior shoulder into the bicep and thumb that lasted one week but has subsided. She continues to report global anterior shoulder pain that is improving. She denies any distal numbness or tingling. She reports she is not interested in surgery at this time.    Review of Systems  Pertinent items are noted in HPI.  All other systems were reviewed and are negative.    Physical Exam  /79   Pulse 77   Ht 5' 5\" (1.651 m)   Wt 66.8 kg (147 lb 3.2 oz)   BMI 24.50 kg/m²   Cons: Appears well.  No apparent distress.  Psych: Alert. Oriented x3.  Mood and affect normal.  Eyes: PERRLA, EOMI  Resp: Normal effort.  No audible wheezing or stridor.  CV: Palpable pulse.  No discernable arrhythmia.  No LE edema.  Lymph:  No palpable cervical, axillary, or inguinal lymphadenopathy.  Skin: Warm.  No palpable masses.  No visible lesions.  Neuro: Normal muscle tone.  Normal and symmetric DTR's.     Left Shoulder Exam  Alignment / Posture:  Normal shoulder posture. No scapular protraction.  Inspection:  No swelling. No edema. No erythema. No ecchymosis.  Palpation:  No tenderness.  ROM:  Shoulder . Shoulder ER 80. Shoulder IR T6. " Shoulder Abduction 120.  Strength:  5/5 supraspinatus, infraspinatus, and subscapularis.  Stability:  (-) Active compression test.  Tests: (-) [place]. (-) Speed. (-) [place]. (-) Painful Arc.   Neurovascular:  Sensation intact in Ax/R/M/U nerve distributions. 2+ radial pulse.      Studies Reviewed  No studies to review    Procedures  No procedures today.    Medical, Surgical, Family, and Social History  The patient's medical history, family history, and social history, were reviewed and updated as appropriate.    Past Medical History:   Diagnosis Date    Endometriosis        Past Surgical History:   Procedure Laterality Date     SECTION      , 2006    HYSTERECTOMY N/A 2016    Procedure: EXCISION  SCAR ENDOMETRIOSIS painful nodule;  Surgeon: Kiran Mccallum MD;  Location: [facility];  Service:        Family History   Problem Relation Age of Onset    No Known Problems Mother     No Known Problems Father     No Known Problems Sister     No Known Problems Daughter     No Known Problems Maternal Grandmother     No Known Problems Maternal Grandfather     No Known Problems Paternal Grandmother     No Known Problems Paternal Grandfather     No Known Problems Son     Endometrial cancer Maternal Aunt     Cancer Maternal Aunt     No Known Problems Paternal Aunt     Cancer Paternal Aunt         type unknown    BRCA2 Positive Neg Hx     BRCA2 Negative Neg Hx     BRCA1 Positive Neg Hx     BRCA1 Negative Neg Hx     BRCA 1/2 Neg Hx     Ovarian cancer Neg Hx     Colon cancer Neg Hx     Breast cancer Neg Hx     Breast cancer additional onset Neg Hx        Social History     Occupational History    Occupation: cleaning    Tobacco Use    Smoking status: Never    Smokeless tobacco: Never   Vaping Use    Vaping status: Never Used   Substance and Sexual Activity    Alcohol use: No    Drug use: No    Sexual activity: Yes     Partners: Male     Birth control/protection: OCP       No Known  Allergies      Current Outpatient Medications:     Magnesium 400 MG TABS, Take 1 tab PO daily for headaches, Disp: 90 tablet, Rfl: 3    Multiple Vitamins-Minerals (MULTIVITAMIN WITH MINERALS) tablet, Take 1 tablet by mouth daily, Disp: , Rfl:     norgestimate-ethinyl estradiol (ORTHO-CYCLEN) 0.25-35 MG-MCG per tablet, Take 1 tablet by mouth daily, Disp: 64 tablet, Rfl: 1    amitriptyline (ELAVIL) 50 mg tablet, Take 1 tablet (50 mg total) by mouth daily at bedtime, Disp: 30 tablet, Rfl: 1    ibuprofen (MOTRIN) 800 mg tablet, Take 1 tablet (800 mg total) by mouth daily as needed for headaches (Patient not taking: Reported on 10/4/2023), Disp: 15 tablet, Rfl: 0    methocarbamol (ROBAXIN) 500 mg tablet, Take 1 tablet (500 mg total) by mouth 2 (two) times a day (Patient not taking: Reported on 10/4/2023), Disp: 20 tablet, Rfl: 0    naproxen (Naprosyn) 500 mg tablet, Take 1 tablet (500 mg total) by mouth 2 (two) times a day with meals (Patient not taking: Reported on 10/4/2023), Disp: 30 tablet, Rfl: 0    rizatriptan (Maxalt) 10 MG tablet, Take 1 tablet (10 mg total) by mouth once as needed for migraine May repeat in 2 hours if needed, Disp: 9 tablet, Rfl: 2    sertraline (ZOLOFT) 50 mg tablet, TAKE 1/2 TABLET BY MOUTH IN THE MORNING X 1 WEEK. THEN TAKE 1 FULL TABLET DAILY IN AM (Patient not taking: Reported on 9/8/2022), Disp: , Rfl:     temazepam (RESTORIL) 15 mg capsule, Take 15 mg by mouth daily at bedtime (Patient not taking: Reported on 9/8/2022), Disp: , Rfl:     valACYclovir (VALTREX) 500 mg tablet, Take 1 tablet (500 mg total) by mouth 2 (two) times a day as needed (HSV outbreak) for up to 12 days, Disp: 6 tablet, Rfl: 3      Belinda Roberson    Scribe Attestation      I,:  Belinda Roberson am acting as a scribe while in the presence of the attending physician.:       I,:  Lukas Rodrigues MD personally performed the services described in this documentation    as scribed in my presence.:

## 2024-03-29 ENCOUNTER — HOSPITAL ENCOUNTER (OUTPATIENT)
Dept: MAMMOGRAPHY | Facility: CLINIC | Age: 44
Discharge: HOME | End: 2024-03-29
Payer: COMMERCIAL

## 2024-03-29 VITALS — HEIGHT: 65 IN | BODY MASS INDEX: 24.49 KG/M2 | WEIGHT: 147 LBS

## 2024-03-29 DIAGNOSIS — Z12.31 ENCOUNTER FOR SCREENING MAMMOGRAM FOR MALIGNANT NEOPLASM OF BREAST: ICD-10-CM

## 2024-03-29 PROCEDURE — 77067 SCR MAMMO BI INCL CAD: CPT

## 2024-03-29 PROCEDURE — 77063 BREAST TOMOSYNTHESIS BI: CPT

## 2024-08-06 ENCOUNTER — OFFICE VISIT (OUTPATIENT)
Dept: INTERNAL MEDICINE CLINIC | Facility: CLINIC | Age: 44
End: 2024-08-06

## 2024-08-06 VITALS
OXYGEN SATURATION: 98 % | SYSTOLIC BLOOD PRESSURE: 113 MMHG | TEMPERATURE: 97.9 F | DIASTOLIC BLOOD PRESSURE: 74 MMHG | BODY MASS INDEX: 23.53 KG/M2 | HEART RATE: 64 BPM | WEIGHT: 141.4 LBS

## 2024-08-06 DIAGNOSIS — R21 FACIAL RASH: Primary | ICD-10-CM

## 2024-08-06 DIAGNOSIS — B00.9 HSV INFECTION: ICD-10-CM

## 2024-08-06 DIAGNOSIS — Z00.00 ROUTINE ADULT HEALTH MAINTENANCE: ICD-10-CM

## 2024-08-06 RX ORDER — TRIAMCINOLONE ACETONIDE 1 MG/G
CREAM TOPICAL 2 TIMES DAILY
Qty: 28.4 G | Refills: 0 | Status: SHIPPED | OUTPATIENT
Start: 2024-08-06 | End: 2024-08-13 | Stop reason: ALTCHOICE

## 2024-08-06 RX ORDER — VALACYCLOVIR HYDROCHLORIDE 500 MG/1
500 TABLET, FILM COATED ORAL 2 TIMES DAILY PRN
Qty: 6 TABLET | Refills: 3 | Status: SHIPPED | OUTPATIENT
Start: 2024-08-06 | End: 2024-08-20

## 2024-08-06 NOTE — ASSESSMENT & PLAN NOTE
Patient has not been seen in clinic for health maintenance since 2022  Patient due for routine lab work and screening

## 2024-08-06 NOTE — ASSESSMENT & PLAN NOTE
History of recurrent herpes labialis  Using Valtrex PRN for outbreaks  Current facial rash does not look vesicular on exam    Plan  Re-filled Valtrex PRN for herpes labialis outbreaks

## 2024-08-06 NOTE — ASSESSMENT & PLAN NOTE
Patient reports development of new raised lesions on face 3-days ago  Associated with pruritus that developed yesterday  On exam, patient has 4 raised papules that are non-tender and non-pruritic to palpation (Seen in [employer] [facility])  Patient has history of recurrent herpes labialis, but not present on exam today  Strongly consider contact dermatitis with unknown exposure due to recent travel to [place] returned 1-week ago    Plan  Start Triamcolone cream topically to affected areas  Advised to slowly taper down application if improved

## 2024-08-06 NOTE — PATIENT INSTRUCTIONS
Please note the following medication changes  Please continue your Valtrex as needed  Please use the topical Triamcinolone cream for your facial rash; Apply twice a day to the affected areas for 2 weeks    Please go to a [facility] to have routine lab work done for your health maintenance    Please return in the clinic in 2 months to review your blood work and assess if the facial rash has improved

## 2024-08-06 NOTE — PROGRESS NOTES
[facility]  INTERNAL MEDICINE OFFICE VISIT     PATIENT INFORMATION     Jeanna Bell   43 y.o. female   MRN: [id number]    ASSESSMENT/PLAN     Problem List Items Addressed This Visit       HSV infection     History of recurrent herpes labialis  Using Valtrex PRN for outbreaks  Current facial rash does not look vesicular on exam    Plan  Re-filled Valtrex PRN for herpes labialis outbreaks         Relevant Medications    valACYclovir (VALTREX) 500 mg tablet    Facial rash - Primary     Patient reports development of new raised lesions on face 3-days ago  Associated with pruritus that developed yesterday  On exam, patient has 4 raised papules that are non-tender and non-pruritic to palpation (Seen in Media Tab)  Patient has history of recurrent herpes labialis, but not present on exam today  Strongly consider contact dermatitis with unknown exposure due to recent travel to [place] returned 1-week ago    Plan  Start Triamcolone cream topically to affected areas  Advised to slowly taper down application if improved         Relevant Medications    triamcinolone (KENALOG) 0.1 % cream    Routine adult health maintenance     Patient has not been seen in clinic for health maintenance since 2022  Patient due for routine lab work and screening         Relevant Orders    CBC and differential    Comprehensive metabolic panel    Lipid panel    Hemoglobin A1C    Hepatitis C antibody     Schedule a follow-up appointment in 8 weeks for follow up on routine lab work and facial rash assessment.    HEALTH MAINTENANCE     Immunization History   Administered Date(s) Administered    COVID-19 PFIZER VACCINE 0.3 ML IM 04/12/2021, 05/06/2021    COVID-19 Pfizer Vac BIVALENT David-sucrose 12 Yr+ IM 11/01/2022    INFLUENZA 11/15/2014, 12/15/2019, 02/14/2022    Influenza, injectable, quadrivalent, preservative free 0.5 mL 01/21/2021, 02/14/2022    Tdap 01/21/2021     CHIEF COMPLAINT     Chief Complaint   Patient  presents with    Mass     Bump all over face, started on Sunday       HISTORY OF PRESENT ILLNESS     Jeanna Bell is a 43 y.o. patient with a PMHx of recurrent herpes labialis, depression and headaches who presents to the clinic for 3-day history of new facial rash. Patient says she first noticed several raised lesions on her face, prominently at her forehead and nose, that were new. She says this has been associated with new pruritus that is generalized to her body and face yesterday and is now intermittent. Patient recently returned from a month-long trip in [place] 1-week ago, where she endorsed no new sick contacts or new exposures during that time. She denies any rhinorrhea, sore throat, cough, shortness of breath, chest pain or abdominal pain.    REVIEW OF SYSTEMS     Review of Systems   Constitutional: Negative.    HENT: Negative.     Eyes: Negative.    Respiratory: Negative.     Cardiovascular: Negative.    Gastrointestinal: Negative.    Endocrine: Negative.    Genitourinary: Negative.    Musculoskeletal: Negative.    Skin:  Positive for rash.   Allergic/Immunologic: Negative.    Neurological: Negative.    Hematological: Negative.    Psychiatric/Behavioral: Negative.     All other systems reviewed and are negative.    OBJECTIVE     Vitals:    08/06/24 1441   BP: 113/74   BP Location: Left arm   Patient Position: Sitting   Cuff Size: Standard   Pulse: 64   Temp: 97.9 °F (36.6 °C)   TempSrc: Temporal   SpO2: 98%   Weight: 64.1 kg (141 lb 6.4 oz)     Physical Exam  Vitals reviewed.   Constitutional:       General: She is not in acute distress.     Appearance: Normal appearance. She is normal weight.   HENT:      Head: Normocephalic and atraumatic.      Nose: Nose normal. No congestion or rhinorrhea.      Mouth/Throat:      Mouth: Mucous membranes are moist.      Pharynx: Oropharynx is clear. No oropharyngeal exudate or posterior oropharyngeal erythema.   Eyes:      Extraocular Movements: Extraocular  movements intact.      Conjunctiva/sclera: Conjunctivae normal.      Pupils: Pupils are equal, round, and reactive to light.   Cardiovascular:      Rate and Rhythm: Normal rate and regular rhythm.      Pulses: Normal pulses.      Heart sounds: Normal heart sounds. No murmur heard.  Pulmonary:      Effort: Pulmonary effort is normal. No respiratory distress.      Breath sounds: Normal breath sounds. No wheezing, rhonchi or rales.   Abdominal:      General: Abdomen is flat. Bowel sounds are normal. There is no distension.      Palpations: Abdomen is soft.      Tenderness: There is no abdominal tenderness.   Musculoskeletal:      Cervical back: Normal range of motion and neck supple. No tenderness.      Right lower leg: No edema.      Left lower leg: No edema.   Lymphadenopathy:      Cervical: No cervical adenopathy.   Skin:     General: Skin is warm and dry.      Capillary Refill: Capillary refill takes less than 2 seconds.      Coloration: Skin is not jaundiced.      Findings: Rash (4 raised erythematous papules on face that are non-tender to palpation) present.   Neurological:      General: No focal deficit present.      Mental Status: She is alert and oriented to person, place, and time. Mental status is at baseline.       CURRENT MEDICATIONS     Current Outpatient Medications:     triamcinolone (KENALOG) 0.1 % cream, Apply topically 2 (two) times a day for 14 days Please apply twice a day to the affected area in your face for 2 weeks. If improved, please change to once every other day, Disp: 28.4 g, Rfl: 0    valACYclovir (VALTREX) 500 mg tablet, Take 1 tablet (500 mg total) by mouth 2 (two) times a day as needed (HSV outbreak) for up to 14 days, Disp: 6 tablet, Rfl: 3    amitriptyline (ELAVIL) 50 mg tablet, Take 1 tablet (50 mg total) by mouth daily at bedtime, Disp: 30 tablet, Rfl: 1    ibuprofen (MOTRIN) 800 mg tablet, Take 1 tablet (800 mg total) by mouth daily as needed for headaches (Patient not taking:  Reported on 10/4/2023), Disp: 15 tablet, Rfl: 0    Magnesium 400 MG TABS, Take 1 tab PO daily for headaches, Disp: 90 tablet, Rfl: 3    methocarbamol (ROBAXIN) 500 mg tablet, Take 1 tablet (500 mg total) by mouth 2 (two) times a day (Patient not taking: Reported on 10/4/2023), Disp: 20 tablet, Rfl: 0    Multiple Vitamins-Minerals (MULTIVITAMIN WITH MINERALS) tablet, Take 1 tablet by mouth daily, Disp: , Rfl:     naproxen (Naprosyn) 500 mg tablet, Take 1 tablet (500 mg total) by mouth 2 (two) times a day with meals (Patient not taking: Reported on 10/4/2023), Disp: 30 tablet, Rfl: 0    norgestimate-ethinyl estradiol (ORTHO-CYCLEN) 0.25-35 MG-MCG per tablet, Take 1 tablet by mouth daily, Disp: 64 tablet, Rfl: 1    rizatriptan (Maxalt) 10 MG tablet, Take 1 tablet (10 mg total) by mouth once as needed for migraine May repeat in 2 hours if needed, Disp: 9 tablet, Rfl: 2    sertraline (ZOLOFT) 50 mg tablet, TAKE 1/2 TABLET BY MOUTH IN THE MORNING X 1 WEEK. THEN TAKE 1 FULL TABLET DAILY IN AM (Patient not taking: Reported on 2022), Disp: , Rfl:     temazepam (RESTORIL) 15 mg capsule, Take 15 mg by mouth daily at bedtime (Patient not taking: Reported on 2022), Disp: , Rfl:     PAST MEDICAL & SURGICAL HISTORY     Past Medical History:   Diagnosis Date    Endometriosis      Past Surgical History:   Procedure Laterality Date     SECTION      , 2006    HYSTERECTOMY N/A 2016    Procedure: EXCISION  SCAR ENDOMETRIOSIS painful nodule;  Surgeon: Kiran Mccallum MD;  Location: [facility];  Service:      SOCIAL & FAMILY HISTORY     Social History     Socioeconomic History    Marital status: /Civil Union     Spouse name: Not on file    Number of children: 2    Years of education: Not on file    Highest education level: Not on file   Occupational History    Occupation: cleaning    Tobacco Use    Smoking status: Never    Smokeless tobacco: Never   Vaping Use    Vaping status: Never Used    Substance and Sexual Activity    Alcohol use: No    Drug use: No    Sexual activity: Yes     Partners: Male     Birth control/protection: OCP   Other Topics Concern    Not on file   Social History Narrative    Not on file     Social Determinants of Health     Financial Resource Strain: Low Risk  (8/6/2024)    Overall Financial Resource Strain (CARDIA)     Difficulty of Paying Living Expenses: Not hard at all   Food Insecurity: No Food Insecurity (10/4/2023)    Hunger Vital Sign     Worried About Running Out of Food in the Last Year: Never true     Ran Out of Food in the Last Year: Never true   Transportation Needs: No Transportation Needs (8/6/2024)    PRAPARE - Transportation     Lack of Transportation (Medical): No     Lack of Transportation (Non-Medical): No   Physical Activity: Sufficiently Active (7/29/2020)    Exercise Vital Sign     Days of Exercise per Week: 6 days     Minutes of Exercise per Session: 90 min   Stress: Stress Concern Present (2/14/2022)    [employer] [place] [employer] - Occupational Stress Questionnaire     Feeling of Stress : To some extent   Social Connections: Moderately Integrated (7/29/2020)    Social Connection and Isolation Panel [NHANES]     Frequency of Communication with Friends and Family: More than three times a week     Frequency of Social Gatherings with Friends and Family: More than three times a week     Attends Baptism Services: More than 4 times per year     Active Member of Clubs or Organizations: No     Attends Club or Organization Meetings: Never     Marital Status:    Intimate Partner Violence: Not At Risk (7/29/2020)    Humiliation, Afraid, Rape, and Kick questionnaire     Fear of Current or Ex-Partner: No     Emotionally Abused: No     Physically Abused: No     Sexually Abused: No   Housing Stability: Low Risk  (8/6/2024)    Housing Stability Vital Sign     Unable to Pay for Housing in the Last Year: No     Number of Times Moved in the Last Year:  1     Homeless in the Last Year: No     Social History     Substance and Sexual Activity   Alcohol Use No       Social History     Substance and Sexual Activity   Drug Use No     Social History     Tobacco Use   Smoking Status Never   Smokeless Tobacco Never     Family History   Problem Relation Age of Onset    No Known Problems Mother     No Known Problems Father     No Known Problems Sister     No Known Problems Daughter     No Known Problems Maternal Grandmother     No Known Problems Maternal Grandfather     No Known Problems Paternal Grandmother     No Known Problems Paternal Grandfather     No Known Problems Son     Endometrial cancer Maternal Aunt     Cancer Maternal Aunt     No Known Problems Paternal Aunt     Cancer Paternal Aunt         type unknown    BRCA2 Positive Neg Hx     BRCA2 Negative Neg Hx     BRCA1 Positive Neg Hx     BRCA1 Negative Neg Hx     BRCA 1/2 Neg Hx     Ovarian cancer Neg Hx     Colon cancer Neg Hx     Breast cancer Neg Hx     Breast cancer additional onset Neg Hx          ==  Vlad Kline MD  [facility] Internal Medicine Res[facility], PGY-I    [address], [address]  [place] [address]  Office: [phone]  Fax: [phone]     ====================================  PLEASE NOTE:  This encounter was completed utilizing the Dragon [employer] Voice Recognition Software. Grammatical errors, random word insertions, pronoun errors and incomplete sentences are occasional consequences of the system due to software limitations, ambient noise and hardware issues.These may be missed by proof reading prior to affixing electronic signature. Any questions or concerns about the content, text or information contained within the body of this dictation should be directly addressed to the physician for clarification. Please do not hesitate to call me directly if you have any any questions or concerns.

## 2024-08-09 ENCOUNTER — APPOINTMENT (OUTPATIENT)
Dept: LAB | Age: 44
End: 2024-08-09
Payer: COMMERCIAL

## 2024-08-09 DIAGNOSIS — Z00.00 ROUTINE ADULT HEALTH MAINTENANCE: ICD-10-CM

## 2024-08-09 LAB
ALBUMIN SERPL BCG-MCNC: 3.8 G/DL (ref 3.5–5)
ALP SERPL-CCNC: 44 U/L (ref 34–104)
ALT SERPL W P-5'-P-CCNC: 11 U/L (ref 7–52)
ANION GAP SERPL CALCULATED.3IONS-SCNC: 4 MMOL/L (ref 4–13)
AST SERPL W P-5'-P-CCNC: 14 U/L (ref 13–39)
BASOPHILS # BLD AUTO: 0.03 THOUSANDS/ÂΜL (ref 0–0.1)
BASOPHILS NFR BLD AUTO: 1 % (ref 0–1)
BILIRUB SERPL-MCNC: 0.46 MG/DL (ref 0.2–1)
BUN SERPL-MCNC: 9 MG/DL (ref 5–25)
CALCIUM SERPL-MCNC: 9.1 MG/DL (ref 8.4–10.2)
CHLORIDE SERPL-SCNC: 106 MMOL/L (ref 96–108)
CHOLEST SERPL-MCNC: 165 MG/DL
CO2 SERPL-SCNC: 28 MMOL/L (ref 21–32)
CREAT SERPL-MCNC: 0.73 MG/DL (ref 0.6–1.3)
EOSINOPHIL # BLD AUTO: 0.07 THOUSAND/ÂΜL (ref 0–0.61)
EOSINOPHIL NFR BLD AUTO: 2 % (ref 0–6)
ERYTHROCYTE [DISTWIDTH] IN BLOOD BY AUTOMATED COUNT: 11.7 % (ref 11.6–15.1)
EST. AVERAGE GLUCOSE BLD GHB EST-MCNC: 97 MG/DL
GFR SERPL CREATININE-BSD FRML MDRD: 101 ML/MIN/1.73SQ M
GLUCOSE P FAST SERPL-MCNC: 86 MG/DL (ref 65–99)
HBA1C MFR BLD: 5 %
HCT VFR BLD AUTO: 42.1 % (ref 34.8–46.1)
HCV AB SER QL: NORMAL
HDLC SERPL-MCNC: 80 MG/DL
HGB BLD-MCNC: 14.1 G/DL (ref 11.5–15.4)
IMM GRANULOCYTES # BLD AUTO: 0.01 THOUSAND/UL (ref 0–0.2)
IMM GRANULOCYTES NFR BLD AUTO: 0 % (ref 0–2)
LDLC SERPL CALC-MCNC: 65 MG/DL (ref 0–100)
LYMPHOCYTES # BLD AUTO: 1.59 THOUSANDS/ÂΜL (ref 0.6–4.47)
LYMPHOCYTES NFR BLD AUTO: 38 % (ref 14–44)
MCH RBC QN AUTO: 32 PG (ref 26.8–34.3)
MCHC RBC AUTO-ENTMCNC: 33.5 G/DL (ref 31.4–37.4)
MCV RBC AUTO: 96 FL (ref 82–98)
MONOCYTES # BLD AUTO: 0.26 THOUSAND/ÂΜL (ref 0.17–1.22)
MONOCYTES NFR BLD AUTO: 6 % (ref 4–12)
NEUTROPHILS # BLD AUTO: 2.21 THOUSANDS/ÂΜL (ref 1.85–7.62)
NEUTS SEG NFR BLD AUTO: 53 % (ref 43–75)
NONHDLC SERPL-MCNC: 85 MG/DL
NRBC BLD AUTO-RTO: 0 /100 WBCS
PLATELET # BLD AUTO: 277 THOUSANDS/UL (ref 149–390)
PMV BLD AUTO: 11 FL (ref 8.9–12.7)
POTASSIUM SERPL-SCNC: 4.8 MMOL/L (ref 3.5–5.3)
PROT SERPL-MCNC: 6.5 G/DL (ref 6.4–8.4)
RBC # BLD AUTO: 4.4 MILLION/UL (ref 3.81–5.12)
SODIUM SERPL-SCNC: 138 MMOL/L (ref 135–147)
TRIGL SERPL-MCNC: 99 MG/DL
WBC # BLD AUTO: 4.17 THOUSAND/UL (ref 4.31–10.16)

## 2024-08-09 PROCEDURE — 85025 COMPLETE CBC W/AUTO DIFF WBC: CPT

## 2024-08-09 PROCEDURE — 86803 HEPATITIS C AB TEST: CPT

## 2024-08-09 PROCEDURE — 80061 LIPID PANEL: CPT

## 2024-08-09 PROCEDURE — 83036 HEMOGLOBIN GLYCOSYLATED A1C: CPT

## 2024-08-09 PROCEDURE — 80053 COMPREHEN METABOLIC PANEL: CPT

## 2024-08-09 PROCEDURE — 36415 COLL VENOUS BLD VENIPUNCTURE: CPT

## 2024-08-13 DIAGNOSIS — R21 FACIAL RASH: Primary | ICD-10-CM

## 2024-08-13 RX ORDER — BENZOCAINE/MENTHOL 6 MG-10 MG
LOZENGE MUCOUS MEMBRANE 4 TIMES DAILY PRN
Qty: 28 G | Refills: 1 | Status: SHIPPED | OUTPATIENT
Start: 2024-08-13

## 2024-09-02 ENCOUNTER — NURSE TRIAGE (OUTPATIENT)
Dept: OTHER | Facility: OTHER | Age: 44
End: 2024-09-02

## 2024-09-02 ENCOUNTER — HOSPITAL ENCOUNTER (EMERGENCY)
Facility: HOSPITAL | Age: 44
Discharge: HOME | End: 2024-09-02
Attending: EMERGENCY MEDICINE
Payer: COMMERCIAL

## 2024-09-02 VITALS
OXYGEN SATURATION: 100 % | TEMPERATURE: 98.3 F | RESPIRATION RATE: 18 BRPM | DIASTOLIC BLOOD PRESSURE: 88 MMHG | SYSTOLIC BLOOD PRESSURE: 142 MMHG | HEART RATE: 76 BPM

## 2024-09-02 DIAGNOSIS — B00.9 HSV INFECTION: ICD-10-CM

## 2024-09-02 DIAGNOSIS — N76.0 BV (BACTERIAL VAGINOSIS): ICD-10-CM

## 2024-09-02 DIAGNOSIS — N76.0 ACUTE VAGINITIS: Primary | ICD-10-CM

## 2024-09-02 DIAGNOSIS — B96.89 BV (BACTERIAL VAGINOSIS): ICD-10-CM

## 2024-09-02 DIAGNOSIS — A60.04 HERPES SIMPLEX VULVOVAGINITIS: ICD-10-CM

## 2024-09-02 LAB
BACTERIA UR QL AUTO: ABNORMAL /HPF
BILIRUB UR QL STRIP: NEGATIVE
CLARITY UR: ABNORMAL
COLOR UR: ABNORMAL
GLUCOSE UR STRIP-MCNC: NEGATIVE MG/DL
HGB UR QL STRIP.AUTO: ABNORMAL
KETONES UR STRIP-MCNC: NEGATIVE MG/DL
LEUKOCYTE ESTERASE UR QL STRIP: ABNORMAL
NITRITE UR QL STRIP: NEGATIVE
NON-SQ EPI CELLS URNS QL MICRO: ABNORMAL /HPF
PH UR STRIP.AUTO: 7 [PH]
PROT UR STRIP-MCNC: NEGATIVE MG/DL
RBC #/AREA URNS AUTO: ABNORMAL /HPF
SP GR UR STRIP.AUTO: 1.01 (ref 1–1.03)
UROBILINOGEN UR STRIP-ACNC: <2 MG/DL
WBC #/AREA URNS AUTO: ABNORMAL /HPF

## 2024-09-02 PROCEDURE — 87086 URINE CULTURE/COLONY COUNT: CPT

## 2024-09-02 PROCEDURE — 81514 NFCT DS BV&VAGINITIS DNA ALG: CPT

## 2024-09-02 PROCEDURE — 81001 URINALYSIS AUTO W/SCOPE: CPT

## 2024-09-02 PROCEDURE — 99284 EMERGENCY DEPT VISIT MOD MDM: CPT | Performed by: EMERGENCY MEDICINE

## 2024-09-02 PROCEDURE — 99283 EMERGENCY DEPT VISIT LOW MDM: CPT

## 2024-09-02 RX ORDER — VALACYCLOVIR HYDROCHLORIDE 500 MG/1
500 TABLET, FILM COATED ORAL 2 TIMES DAILY PRN
Qty: 6 TABLET | Refills: 0 | Status: SHIPPED | OUTPATIENT
Start: 2024-09-02 | End: 2024-09-16

## 2024-09-02 NOTE — ED ATTENDING ATTESTATION
"I, Garrick Monae MD, saw and evaluated the patient. I have discussed the patient with the resident and agree with the resident's findings, Plan of Care, and MDM as documented in the resident's note, except where noted. All available labs and Radiology studies were reviewed.  I was present for key portions of any procedure(s) performed by the resident and I was immediately available to provide assistance.    At this point I agree with the current assessment done in the Emergency Department.  I have conducted an independent evaluation of this patient a history and physical is as follows:    42 yo female with a history of major depressive disorder, endometriosis, ovarian cysts, and herpes labialis presents to the ED complaining of vaginal discomfort. The patient some \"burning\" pain in her vagina x several days. She looked at the area with a mirror and noted \"it was swollen and red with bumps\". She says the lesions look like the herpes outbreaks she gets around her mouth. (+) Occasional dysuria but no urgency or increased frequency. She also noticed several \"pink\" streaks on the toilet paper after urinating earlier. No vaginal bleeding. (+) Scant \"watery vaginal discharge. (+) Some minor discomfort with sexual intercourse. No abdominal pain, nausea, vomiting, fevers, or chills. No other specific complaints.    ROS: per resident physician note    Gen: NAD, AA&Ox3  HEENT: PERRL, EOMI  Neck: supple  CV: RRR  Lungs: CTA B/L  Abdomen: soft, NT/ND  : (+) herpetic rash to vulvar area, (+) vaginal discharge  Ext: no swelling or deformity  Neuro: 5/5 strength all extremities, sensation grossly intact  Skin: no rash    ED Course  The patient is well appearing with stable vital signs. (+) Herpetic rash visualized on external genitalia. Some vaginal discharge noted as well. Herpes outbreak vs yeast infection vs BV vs STI vs UTI? Will check UA, molecular vaginal panel, and STI probe. The patient was instructed to start her " home Valtrex tonight. Further treatment per ED workup. Safe sexual practices discussed. Will continue to monitor in the ED.      Critical Care Time  Procedures

## 2024-09-03 ENCOUNTER — OFFICE VISIT (OUTPATIENT)
Dept: OBGYN CLINIC | Facility: CLINIC | Age: 44
End: 2024-09-03

## 2024-09-03 VITALS
DIASTOLIC BLOOD PRESSURE: 63 MMHG | BODY MASS INDEX: 23.16 KG/M2 | HEIGHT: 65 IN | HEART RATE: 68 BPM | WEIGHT: 139 LBS | TEMPERATURE: 97.9 F | SYSTOLIC BLOOD PRESSURE: 131 MMHG

## 2024-09-03 DIAGNOSIS — B00.1 HERPES LABIALIS: ICD-10-CM

## 2024-09-03 DIAGNOSIS — Z11.3 ROUTINE SCREENING FOR STI (SEXUALLY TRANSMITTED INFECTION): Primary | ICD-10-CM

## 2024-09-03 DIAGNOSIS — B00.9 HSV INFECTION: ICD-10-CM

## 2024-09-03 DIAGNOSIS — B37.31 YEAST INFECTION OF THE VAGINA: ICD-10-CM

## 2024-09-03 LAB
BV WHIFF TEST VAG QL: POSITIVE
C GLABRATA DNA VAG QL NAA+PROBE: NEGATIVE
C KRUSEI DNA VAG QL NAA+PROBE: NEGATIVE
CANDIDA SP 6 PNL VAG NAA+PROBE: POSITIVE
CLUE CELLS SPEC QL WET PREP: NEGATIVE
PH SMN: 5.5 [PH]
SL AMB POCT WET MOUNT: ABNORMAL
T VAGINALIS DNA VAG QL NAA+PROBE: NEGATIVE
T VAGINALIS VAG QL WET PREP: NEGATIVE
VAGINOSIS/ITIS DNA PNL VAG PROBE+SIG AMP: POSITIVE
YEAST VAG QL WET PREP: ABNORMAL

## 2024-09-03 PROCEDURE — 87591 N.GONORRHOEAE DNA AMP PROB: CPT

## 2024-09-03 PROCEDURE — 87210 SMEAR WET MOUNT SALINE/INK: CPT | Performed by: OBSTETRICS & GYNECOLOGY

## 2024-09-03 PROCEDURE — 87491 CHLMYD TRACH DNA AMP PROBE: CPT

## 2024-09-03 PROCEDURE — 99213 OFFICE O/P EST LOW 20 MIN: CPT | Performed by: OBSTETRICS & GYNECOLOGY

## 2024-09-03 RX ORDER — VALACYCLOVIR HYDROCHLORIDE 1 G/1
1000 TABLET, FILM COATED ORAL DAILY
Qty: 30 TABLET | Refills: 0 | Status: SHIPPED | OUTPATIENT
Start: 2024-09-03 | End: 2024-10-03

## 2024-09-03 RX ORDER — FLUCONAZOLE 150 MG/1
150 TABLET ORAL ONCE
Qty: 1 TABLET | Refills: 1 | Status: SHIPPED | OUTPATIENT
Start: 2024-09-03 | End: 2024-09-03

## 2024-09-03 NOTE — PROGRESS NOTES
"OB/GYN VISIT  Jeanna Bell  9/3/2024  11:52 AM    Subjective:     Jeanna Bell is a 43 y.o.  female who presents for vaginal bumps and abnormal vaginal discharge that she reports is white and chunky. She also reports pain with sexual intercourse. This began Saturday. She has a known history of HSV-2, for which she takes valtrex. She began taking valtrex and the lesions went away. She was seen in the ED over the weekend, with molecular vaginal panel pending. This has never happened before and she is feeling concerned.       Past Medical History:   Diagnosis Date    Endometriosis      Past Surgical History:   Procedure Laterality Date     SECTION      , 2006    HYSTERECTOMY N/A 2016    Procedure: EXCISION  SCAR ENDOMETRIOSIS painful nodule;  Surgeon: Kiran Mccallum MD;  Location: [facility];  Service:        Objective:    Vitals: Blood pressure 131/63, pulse 68, temperature 97.9 °F (36.6 °C), temperature source Oral, height 5' 5\" (1.651 m), weight 63 kg (139 lb).Body mass index is 23.13 kg/m².    Physical Exam  Vitals reviewed.   Constitutional:       Appearance: Normal appearance. She is normal weight.   Eyes:      Pupils: Pupils are equal, round, and reactive to light.   Cardiovascular:      Rate and Rhythm: Normal rate.   Pulmonary:      Effort: Pulmonary effort is normal.   Abdominal:      General: Abdomen is flat.      Palpations: Abdomen is soft.   Genitourinary:     Comments: No bumps or lesions seen on external genitalia, on speculum exam thick chunky cottage cheese like discharge seen.   Neurological:      Mental Status: She is alert.           Assessment/Plan:  Problem List Items Addressed This Visit       Herpes labialis    Relevant Medications    valACYclovir (VALTREX) 1,000 mg tablet    HSV infection     Continue valtrex 1g daily as needed for outbreaks         Relevant Medications    valACYclovir (VALTREX) 1,000 mg tablet    Yeast infection of the " vagina     [place] white discharge seen on microscopy   Diflucan once (with one refill as needed) and monistat 7 sent to pharmacy         Relevant Medications    miconazole (MONISTAT 7) 100 mg vaginal suppository    fluconazole (DIFLUCAN) 150 mg tablet    Other Relevant Orders    POCT wet mount (Completed)     Other Visit Diagnoses       Routine screening for STI (sexually transmitted infection)    -  Primary    Relevant Orders    Chlamydia/GC amplified DNA by PCR            D/w Dr. Keven Parker, DO  9/3/2024  11:52 AM        Please note that while the recent CURES Act permits medical records be visible for patient review, clinical documentation is intended for utilization by healthcare professionals.  All test results are immediately available through [facility] as well, and we will review results at earliest opportunity and contact you with the appropriate urgency.  If you have a question about something you see in your chart, please don't hesitate to ask about it at your next appointment.

## 2024-09-03 NOTE — DISCHARGE INSTRUCTIONS
Please take valtrex as directed for treatment of likely herpres vaginalis and follow up with your OB/GYN promptly for vaginal discharge. We will contact you if any of your test results are positive and treat accordingly.

## 2024-09-03 NOTE — ASSESSMENT & PLAN NOTE
[place] white discharge seen on microscopy   Diflucan once (with one refill as needed) and monistat 7 sent to pharmacy

## 2024-09-04 LAB
BACTERIA UR CULT: NORMAL
C TRACH DNA SPEC QL NAA+PROBE: NEGATIVE
N GONORRHOEA DNA SPEC QL NAA+PROBE: NEGATIVE

## 2024-09-04 RX ORDER — METRONIDAZOLE 500 MG/1
500 TABLET ORAL EVERY 12 HOURS SCHEDULED
Qty: 14 TABLET | Refills: 0 | Status: SHIPPED | OUTPATIENT
Start: 2024-09-04 | End: 2024-09-11

## 2024-09-05 PROBLEM — Z00.00 ROUTINE ADULT HEALTH MAINTENANCE: Status: RESOLVED | Noted: 2024-08-06 | Resolved: 2024-09-05

## 2024-09-05 NOTE — ED PROVIDER NOTES
"History  Chief Complaint   Patient presents with    Vaginal Pain     Started yesterday \"I took a mirror to look down there because its very swollen and I saw bumps that look like herps but I'm not sure, now having pain and itching, when I wipe after peeing sometimes I have pink on the paper and it burns when I start to pee but more on the outside\" c/o pain with intercourse      Patient is a 44-year-old female with pertinent past medical history of herpes labialis who presents for evaluation of vaginal pain.  Patient states that yesterday, she started experiencing burning pain in her vagina and vulva.  She states that she took a mirror and looked at the area and noticed multiple small bumps that appeared similar to prior outbreaks of oral cold sores.  She states that she has had associated dysuria, vaginal itching and pain with wiping after going to the bathroom.  She has also had discharge that is mostly white but occasionally blood-tinged.  She has also had associated dyspareunia.  States she is sexually active with 1 male partner.  States that he also has history of oral herpes.  She denies history of sexually transmitted infection.  Denies abdominal pain, nausea, vomiting, fever, chills, diarrhea, constipation, blood in stool.        Prior to Admission Medications   Prescriptions Last Dose Informant Patient Reported? Taking?   Magnesium 400 MG TABS   No No   Sig: Take 1 tab PO daily for headaches   Multiple Vitamins-Minerals (MULTIVITAMIN WITH MINERALS) tablet   Yes No   Sig: Take 1 tablet by mouth daily   amitriptyline (ELAVIL) 50 mg tablet   No No   Sig: Take 1 tablet (50 mg total) by mouth daily at bedtime   hydrocortisone 1 % cream   No No   Sig: Apply topically 4 (four) times a day as needed for irritation   Patient not taking: Reported on 9/3/2024   ibuprofen (MOTRIN) 800 mg tablet   No No   Sig: Take 1 tablet (800 mg total) by mouth daily as needed for headaches   Patient not taking: Reported on 10/4/2023 "   methocarbamol (ROBAXIN) 500 mg tablet   No No   Sig: Take 1 tablet (500 mg total) by mouth 2 (two) times a day   Patient not taking: Reported on 10/4/2023   naproxen (Naprosyn) 500 mg tablet   No No   Sig: Take 1 tablet (500 mg total) by mouth 2 (two) times a day with meals   Patient not taking: Reported on 10/4/2023   norgestimate-ethinyl estradiol (ORTHO-CYCLEN) 0.25-35 MG-MCG per tablet   No No   Sig: Take 1 tablet by mouth daily   rizatriptan (Maxalt) 10 MG tablet   No No   Sig: Take 1 tablet (10 mg total) by mouth once as needed for migraine May repeat in 2 hours if needed   sertraline (ZOLOFT) 50 mg tablet   Yes No   Sig: TAKE 1/2 TABLET BY MOUTH IN THE MORNING X 1 WEEK. THEN TAKE 1 FULL TABLET DAILY IN AM   Patient not taking: Reported on 2022   temazepam (RESTORIL) 15 mg capsule   Yes No   Sig: Take 15 mg by mouth daily at bedtime   Patient not taking: Reported on 2022   valACYclovir (VALTREX) 500 mg tablet   No No   Sig: Take 1 tablet (500 mg total) by mouth 2 (two) times a day as needed (HSV outbreak) for up to 14 days   valACYclovir (VALTREX) 500 mg tablet   No Yes   Sig: Take 1 tablet (500 mg total) by mouth 2 (two) times a day as needed (HSV outbreak) for up to 14 days      Facility-Administered Medications: None       Past Medical History:   Diagnosis Date    Endometriosis        Past Surgical History:   Procedure Laterality Date     SECTION      , 2006    HYSTERECTOMY N/A 2016    Procedure: EXCISION  SCAR ENDOMETRIOSIS painful nodule;  Surgeon: Kiran Mccallum MD;  Location:  MAIN OR;  Service:        Family History   Problem Relation Age of Onset    No Known Problems Mother     No Known Problems Father     No Known Problems Sister     No Known Problems Daughter     No Known Problems Maternal Grandmother     No Known Problems Maternal Grandfather     No Known Problems Paternal Grandmother     No Known Problems Paternal Grandfather     No Known Problems Son      Endometrial cancer Maternal Aunt     Cancer Maternal Aunt     No Known Problems Paternal Aunt     Cancer Paternal Aunt         type unknown    BRCA2 Positive Neg Hx     BRCA2 Negative Neg Hx     BRCA1 Positive Neg Hx     BRCA1 Negative Neg Hx     BRCA 1/2 Neg Hx     Ovarian cancer Neg Hx     Colon cancer Neg Hx     Breast cancer Neg Hx     Breast cancer additional onset Neg Hx      I have reviewed and agree with the history as documented.    E-Cigarette/Vaping    E-Cigarette Use Never User      E-Cigarette/Vaping Substances    Nicotine No     THC No     CBD No     Flavoring No     Other No     Unknown No      Social History     Tobacco Use    Smoking status: Never    Smokeless tobacco: Never   Vaping Use    Vaping status: Never Used   Substance Use Topics    Alcohol use: No    Drug use: No        Review of Systems   All other systems reviewed and are negative.      Physical Exam  ED Triage Vitals [09/02/24 1831]   Temperature Pulse Respirations Blood Pressure SpO2   98.3 °F (36.8 °C) 76 18 142/88 100 %      Temp Source Heart Rate Source Patient Position - Orthostatic VS BP Location FiO2 (%)   Oral Monitor Sitting Left arm --      Pain Score       --             Orthostatic Vital Signs  Vitals:    09/02/24 1831   BP: 142/88   Pulse: 76   Patient Position - Orthostatic VS: Sitting       Physical Exam  Exam conducted with a chaperone present.   Constitutional:       General: She is not in acute distress.     Appearance: Normal appearance. She is not ill-appearing, toxic-appearing or diaphoretic.   HENT:      Head: Normocephalic and atraumatic.   Pulmonary:      Effort: Pulmonary effort is normal.   Abdominal:      General: Abdomen is flat. There is no distension.      Palpations: Abdomen is soft.      Tenderness: There is no abdominal tenderness.   Genitourinary:     Exam position: Lithotomy position.      Cervix: Discharge (thick, white discharge and pooling of thin, rust colored discharged in vaginal fornix)  present.      Comments: Multiple pink papules and occasional clear fluid-filled vesicles on labia minora and majora     Skin:     General: Skin is warm and dry.   Neurological:      Mental Status: She is alert.   Psychiatric:         Mood and Affect: Mood normal.         Behavior: Behavior normal.         ED Medications  Medications - No data to display    Diagnostic Studies  Results Reviewed       Procedure Component Value Units Date/Time    Urine culture [[id number]] Collected: 09/02/24 2022    Lab Status: Final result Specimen: Urine, Clean Catch Updated: 09/04/24 0725     Urine Culture 30,000-39,000 cfu/ml    Molecular Vaginal Panel [[id number]]  (Abnormal) Collected: 09/02/24 2021    Lab Status: Final result Specimen: Genital from Vaginal Updated: 09/03/24 1401     Bacterial Vaginosis Positive     Candida species Positive     Candida glabrata Negative     Lupis krusei Negative     Trichomonas vaginalis Negative    Narrative:      This test has been performed using the FDA-approved BD MAX system for the qualitative detection of bacterial vaginosis markers (Lactobacillus spp., Gardnerella vaginalis, Atopobium vaginae, Bacterial Vaginosis Associated Bacteria-2, Megasphaera-1), Candida spp. (C. albicans, C. tropicalis, C. parapsilosis, C. dubliniensis), Candida glabrata, Candida krusei, and Trichomonas vaginalis in vaginal swabs from patients who are symptomatic for vaginitis/vaginosis using polymerase chain reaction (PCR) methodology.    Negative PCR results do not preclude infection and should not be used as the sole basis for treatment or other patient management decisions.    Positive PCR results are indicative of infection, but do not necessarily indicate the presence of viable organisms and do not rule out co-infection with other bacteria or viruses.    As with all polymerase-chain reaction methodology, extremely low levels of target below the limit of detection may be detected, but results may not be  reproducible.    All test results should be used as an adjunct to clinical observations and other information available to the provider.    Urine Microscopic [[id number]]  (Abnormal) Collected: 09/02/24 2022    Lab Status: Final result Specimen: Urine, Clean Catch Updated: 09/02/24 2037     RBC, UA 4-10 /hpf      WBC, UA 30-50 /hpf      Epithelial Cells Occasional /hpf      Bacteria, UA None Seen /hpf     UA w Reflex to Microscopic w Reflex to Culture [[id number]]  (Abnormal) Collected: 09/02/24 2022    Lab Status: Final result Specimen: Urine, Clean Catch Updated: 09/02/24 2030     Color, UA Light Yellow     Clarity, UA Turbid     Specific Gravity, UA 1.013     pH, UA 7.0     Leukocytes, UA Large     Nitrite, UA Negative     Protein, UA Negative mg/dl      Glucose, UA Negative mg/dl      Ketones, UA Negative mg/dl      Urobilinogen, UA <2.0 mg/dl      Bilirubin, UA Negative     Occult Blood, UA Moderate                   No orders to display         Procedures  Procedures      ED Course                             SBIRT 20yo+      Flowsheet Row Most Recent Value   Initial Alcohol Screen: US AUDIT-C     1. How often do you have a drink containing alcohol? 0 Filed at: 09/02/2024 2045   2. How many drinks containing alcohol do you have on a typical day you are drinking?  0 Filed at: 09/02/2024 2045   3a. Male UNDER 65: How often do you have five or more drinks on one occasion? 0 Filed at: 09/02/2024 2045   3b. FEMALE Any Age, or MALE 65+: How often do you have 4 or more drinks on one occassion? 0 Filed at: 09/02/2024 2045   Audit-C Score 0 Filed at: 09/02/2024 2045   SILVANA: How many times in the past year have you...    Used an illegal drug or used a prescription medication for non-medical reasons? Never Filed at: 09/02/2024 2045                  Medical Decision Making  Jeanna Bell is a 44 y.o. who presents with complaints of vaginal pain and discharge     Vital signs are stable, afebrile    Ddx: herpes vaginalis  most likely   Also concerned for other  infection, such as candida vs. BV vs. Trich vs. G/C vs. UTI    Plan: work up as above, pending upon time of dispo  Based on physical exam, recommend taking valtrex as directed  Will contact patient and treat as appropriate if test results are positive  Recommend prompt follow up with OBGYN    Disposition: Patient stable for discharge. Return precautions provided. Patient understands and is agreeable to plan.          Amount and/or Complexity of Data Reviewed  Labs: ordered.    Risk  Prescription drug management.          Disposition  Final diagnoses:   Acute vaginitis   Herpes simplex vulvovaginitis   BV (bacterial vaginosis)     Time reflects when diagnosis was documented in both MDM as applicable and the Disposition within this note       Time User Action Codes Description Comment    9/2/2024  8:07 PM Sharon Felipe Add [N76.0] Acute vaginitis     9/2/2024  8:07 PM Sharon Felipe Add [A60.04] Herpes simplex vulvovaginitis     9/2/2024  8:07 PM Sharon Felipe Add [B00.9] HSV infection     9/4/2024  1:07 PM Steffanie Jane Add [N76.0,  B96.89] BV (bacterial vaginosis)           ED Disposition       ED Disposition   Discharge    Condition   Stable    Date/Time   Mon Sep 2, 2024 2007    Comment   Jeanna Bell discharge to home/self care.                   Follow-up Information       Follow up With Specialties Details Why Contact Info Additional Information    [facility] Emergency Medicine Go to  If symptoms worsen [address] [address]  [phone] [facility] Emergency Department, [address], [place], [address]   [phone]    DENISE Cotto [facility], Obstetrics, Gynecology, Nurse Practitioner Schedule an appointment as soon as possible for a visit in 1 day for reevaluation [address]  [address]  [facility]  [address]  [phone]               Discharge Medication List as of 9/2/2024  8:09 PM        CONTINUE these medications which have CHANGED    Details   valACYclovir (VALTREX) 500 mg tablet Take 1 tablet (500 mg total) by mouth 2 (two) times a day as needed (HSV outbreak) for up to 14 days, Starting Mon 9/2/2024, Until Mon 9/16/2024 at 2359, Normal           CONTINUE these medications which have NOT CHANGED    Details   amitriptyline (ELAVIL) 50 mg tablet Take 1 tablet (50 mg total) by mouth daily at bedtime, Starting Fri 4/15/2022, Until Wed 10/4/2023, Normal      hydrocortisone 1 % cream Apply topically 4 (four) times a day as needed for irritation, Starting Tue 8/13/2024, Normal      ibuprofen (MOTRIN) 800 mg tablet Take 1 tablet (800 mg total) by mouth daily as needed for headaches, Starting Mon 4/4/2022, Normal      Magnesium 400 MG TABS Take 1 tab PO daily for headaches, Normal      methocarbamol (ROBAXIN) 500 mg tablet Take 1 tablet (500 mg total) by mouth 2 (two) times a day, Starting Sun 5/7/2023, Normal      Multiple Vitamins-Minerals (MULTIVITAMIN WITH MINERALS) tablet Take 1 tablet by mouth daily, Historical Med      naproxen (Naprosyn) 500 mg tablet Take 1 tablet (500 mg total) by mouth 2 (two) times a day with meals, Starting Sun 5/7/2023, Normal      norgestimate-ethinyl estradiol (ORTHO-CYCLEN) 0.25-35 MG-MCG per tablet Take 1 tablet by mouth daily, Starting Mon 8/2/2021, Normal      rizatriptan (Maxalt) 10 MG tablet Take 1 tablet (10 mg total) by mouth once as needed for migraine May repeat in 2 hours if needed, Starting Fri 4/15/2022, Until Thu 9/8/2022 at 2359, Normal      sertraline (ZOLOFT) 50 mg tablet TAKE 1/2 TABLET BY MOUTH IN THE MORNING X 1 WEEK. THEN TAKE 1 FULL TABLET DAILY IN AM, Historical Med      temazepam (RESTORIL) 15 mg capsule Take 15 mg by mouth daily at bedtime, Starting Thu 6/2/2022, Historical Med           No discharge procedures on file.    PDMP Review       None              ED Provider  Attending physically available and evaluated Jeanna Bell. I managed the patient along with the ED Attending.    Electronically Signed by           Sharon Felipe MD  09/04/24 2499

## 2024-09-24 ENCOUNTER — RA CDI HCC (OUTPATIENT)
Dept: OTHER | Facility: HOSPITAL | Age: 44
End: 2024-09-24

## 2024-10-01 ENCOUNTER — OFFICE VISIT (OUTPATIENT)
Dept: INTERNAL MEDICINE CLINIC | Facility: CLINIC | Age: 44
End: 2024-10-01

## 2024-10-01 VITALS
HEART RATE: 63 BPM | TEMPERATURE: 98.1 F | BODY MASS INDEX: 22.3 KG/M2 | DIASTOLIC BLOOD PRESSURE: 82 MMHG | WEIGHT: 134 LBS | SYSTOLIC BLOOD PRESSURE: 124 MMHG

## 2024-10-01 DIAGNOSIS — F33.9 DEPRESSION, RECURRENT (HCC): ICD-10-CM

## 2024-10-01 DIAGNOSIS — B00.1 HERPES LABIALIS: ICD-10-CM

## 2024-10-01 DIAGNOSIS — L50.1 URTICARIA, IDIOPATHIC: Primary | ICD-10-CM

## 2024-10-01 DIAGNOSIS — R92.8 ABNORMAL SCREENING MAMMOGRAM: ICD-10-CM

## 2024-10-01 PROCEDURE — 99213 OFFICE O/P EST LOW 20 MIN: CPT | Performed by: STUDENT IN AN ORGANIZED HEALTH CARE EDUCATION/TRAINING PROGRAM

## 2024-10-01 RX ORDER — VALACYCLOVIR HYDROCHLORIDE 500 MG/1
500 TABLET, FILM COATED ORAL 2 TIMES DAILY PRN
Qty: 6 TABLET | Refills: 0 | Status: SHIPPED | OUTPATIENT
Start: 2024-10-01 | End: 2024-10-15

## 2024-10-01 NOTE — PROGRESS NOTES
Ambulatory Visit  Name: Jeanna Bell      : 1980      MRN: [id number]  Encounter Provider: Vlad Kline MD  Encounter Date: 10/1/2024   Encounter department: [facility]    Assessment & Plan  Abnormal screening mammogram  Patient hs history of cysts on mammogram noted in   Last mammogram in 24 shows no abnormal findings  Patient is due for mammogram in 2025    Plan  Patient advised to go for screening mammogram in 2025    Orders:    Mammo screening bilateral w 3d and cad; Future    Urticaria, idiopathic  Patient was previously seen in 2024 for a facial rash showing several papules  Patient reports that the papules on her face have since resolved since started hydrocortisone 1%  Reporting recurrence of similar papules that are pruritic on her arms and legs that are similarly relieved when using the hydrocortisone  Patient rash is likely mild urticaria that is exacerbated with recurrent herpes flare-ups  Patient's presentation is less likely for contact dermatitis given no recent travel and no use of scented lotions, perfumes or other cosmetic products with fragrances    Plan  Patient can continue to use hydrocortisone 1% PRN for papules  Advised to return to clinic if the rash worsens or new skin changes are present that are not relieved on current therapy       Herpes labialis  Patient has history of recurrent herpes labialis  Takes Valtrex 500 mg BID PRN for flare-ups  Increased to 1000 mg daily outside of office after recent bacterial vs candidal vaginosis for concern of concomitant herpes flare up  Patient reports no new vesicular lesions near her genitals or orally since completing anti-fungal therapy for the vaginosis    Plan  Patient can transition back to Valtrex 500 mg BID PRN once finishing Valtrex 1000 mg course    Orders:    valACYclovir (VALTREX) 500 mg tablet; Take 1 tablet (500 mg total) by mouth 2 (two) times a day as needed  (HSV outbreak) for up to 14 days    Depression, recurrent ([facility])  Patient reports her depression is well controlled on current Amitriptyline regimen    Plan  Patient can continue Amitriptyline 50 mg            History of Present Illness     Jeanna Bell is a 43 y.o. patient with a PMHx of recurrent herpes on valtrex, endometriosis, depression and headaches who presents to the clinic for a follow-up for improvement of her facial rash and review of recent blood work. Patient was seen by OB/GYN on 09/03/24 in the interval period for white vaginal discharge found to have bacterial vaginosis and candida and subsequently started on miconazole for a 7-day course. Patient says she has completed the miconazole course and has since had no vaginal discharge, discomfort or pain since. Patient reports improvement of the facial rash in the interval period on the hydrocortisone cream, but reports more recurrence of pruritic papules on the arms and legs now which are relieved with the hydrocortisone cream. She reports no recurrence of these skin changes in the past 1-month. Patient is otherwise well appearing and denies any new symptoms at this time.        History obtained from : patient  Review of Systems   Constitutional:  Negative for chills, fatigue and fever.   HENT:  Negative for ear pain, hearing loss, rhinorrhea, sinus pain and sore throat.    Eyes:  Negative for pain, discharge and itching.   Respiratory:  Negative for cough and shortness of breath.    Cardiovascular:  Negative for chest pain and palpitations.   Gastrointestinal:  Negative for abdominal pain, constipation, diarrhea and vomiting.   Genitourinary:  Negative for dysuria, flank pain, hematuria, pelvic pain, vaginal bleeding, vaginal discharge and vaginal pain.   Musculoskeletal:  Negative for arthralgias and back pain.   Skin:  Negative for color change and rash.   Neurological:  Negative for seizures and syncope.   Psychiatric/Behavioral:  Negative for  dysphoric mood. The patient is not nervous/anxious.    All other systems reviewed and are negative.    Current Outpatient Medications on File Prior to Visit   Medication Sig Dispense Refill    amitriptyline (ELAVIL) 50 mg tablet Take 1 tablet (50 mg total) by mouth daily at bedtime 30 tablet 1    hydrocortisone 1 % cream Apply topically 4 (four) times a day as needed for irritation (Patient not taking: Reported on 9/3/2024) 28 g 1    Magnesium 400 MG TABS Take 1 tab PO daily for headaches 90 tablet 3    Multiple Vitamins-Minerals (MULTIVITAMIN WITH MINERALS) tablet Take 1 tablet by mouth daily      norgestimate-ethinyl estradiol (ORTHO-CYCLEN) 0.25-35 MG-MCG per tablet Take 1 tablet by mouth daily 64 tablet 1    valACYclovir (VALTREX) 1,000 mg tablet Take 1 tablet (1,000 mg total) by mouth daily 30 tablet 0    [DISCONTINUED] ibuprofen (MOTRIN) 800 mg tablet Take 1 tablet (800 mg total) by mouth daily as needed for headaches (Patient not taking: Reported on 10/4/2023) 15 tablet 0    [DISCONTINUED] methocarbamol (ROBAXIN) 500 mg tablet Take 1 tablet (500 mg total) by mouth 2 (two) times a day (Patient not taking: Reported on 10/4/2023) 20 tablet 0    [DISCONTINUED] miconazole (MONISTAT 7) 100 mg vaginal suppository Insert 1 suppository (100 mg total) into the vagina daily at bedtime 7 suppository 0    [DISCONTINUED] naproxen (Naprosyn) 500 mg tablet Take 1 tablet (500 mg total) by mouth 2 (two) times a day with meals (Patient not taking: Reported on 10/4/2023) 30 tablet 0    [DISCONTINUED] rizatriptan (Maxalt) 10 MG tablet Take 1 tablet (10 mg total) by mouth once as needed for migraine May repeat in 2 hours if needed 9 tablet 2    [DISCONTINUED] sertraline (ZOLOFT) 50 mg tablet TAKE 1/2 TABLET BY MOUTH IN THE MORNING X 1 WEEK. THEN TAKE 1 FULL TABLET DAILY IN AM (Patient not taking: Reported on 9/8/2022)      [DISCONTINUED] temazepam (RESTORIL) 15 mg capsule Take 15 mg by mouth daily at bedtime (Patient not taking:  Reported on 9/8/2022)      [DISCONTINUED] valACYclovir (VALTREX) 500 mg tablet Take 1 tablet (500 mg total) by mouth 2 (two) times a day as needed (HSV outbreak) for up to 14 days 6 tablet 0     No current facility-administered medications on file prior to visit.          Objective     /82 (BP Location: Right arm, Patient Position: Sitting, Cuff Size: Adult)   Pulse 63   Temp 98.1 °F (36.7 °C) (Temporal)   Wt 60.8 kg (134 lb)   LMP  (LMP Unknown)   BMI 22.30 kg/m²     Physical Exam  Vitals and nursing note reviewed.   Constitutional:       General: She is not in acute distress.     Appearance: She is well-developed.   HENT:      Head: Normocephalic and atraumatic.      Right Ear: Tympanic membrane normal.      Left Ear: Tympanic membrane normal.      Nose: Nose normal.      Mouth/Throat:      Mouth: Mucous membranes are moist.      Pharynx: Oropharynx is clear. No oropharyngeal exudate or posterior oropharyngeal erythema.      Comments: No oral vesicular lesions noted  Eyes:      Extraocular Movements: Extraocular movements intact.      Conjunctiva/sclera: Conjunctivae normal.      Pupils: Pupils are equal, round, and reactive to light.   Neck:      Vascular: No carotid bruit.      Comments: Slight erythema noted on left neck anteriorly on observation  Cardiovascular:      Rate and Rhythm: Normal rate and regular rhythm.      Pulses: Normal pulses.      Heart sounds: Normal heart sounds. No murmur heard.  Pulmonary:      Effort: Pulmonary effort is normal. No respiratory distress.      Breath sounds: Normal breath sounds.   Abdominal:      General: Abdomen is flat. There is no distension.      Palpations: Abdomen is soft.      Tenderness: There is no abdominal tenderness.   Musculoskeletal:         General: No swelling.      Cervical back: Neck supple. No tenderness.      Right lower leg: No edema.      Left lower leg: No edema.   Lymphadenopathy:      Cervical: No cervical adenopathy.   Skin:      General: Skin is warm and dry.      Capillary Refill: Capillary refill takes less than 2 seconds.   Neurological:      General: No focal deficit present.      Mental Status: She is alert and oriented to person, place, and time. Mental status is at baseline.   Psychiatric:         Mood and Affect: Mood normal.       Administrative Statements   I have spent a total time of 30 minutes in caring for this patient on the day of the visit/encounter including Diagnostic results, Prognosis, Risks and benefits of tx options, Instructions for management, Patient and family education, Importance of tx compliance, Risk factor reductions, Impressions, Counseling / Coordination of care, Documenting in the medical record, Reviewing / ordering tests, medicine, procedures  , Obtaining or reviewing history  , and Communicating with other healthcare professionals .

## 2024-10-01 NOTE — ASSESSMENT & PLAN NOTE
Patient hs history of cysts on mammogram noted in 2020  Last mammogram in 03/29/24 shows no abnormal findings  Patient is due for mammogram in March 2025    Plan  Patient advised to go for screening mammogram in March 2025    Orders:    Mammo screening bilateral w 3d and cad; Future

## 2024-10-01 NOTE — ASSESSMENT & PLAN NOTE
Patient reports her depression is well controlled on current Amitriptyline regimen    Plan  Patient can continue Amitriptyline 50 mg

## 2024-10-01 NOTE — ASSESSMENT & PLAN NOTE
Patient was previously seen in August 2024 for a facial rash showing several papules  Patient reports that the papules on her face have since resolved since started hydrocortisone 1%  Reporting recurrence of similar papules that are pruritic on her arms and legs that are similarly relieved when using the hydrocortisone  Patient rash is likely mild urticaria that is exacerbated with recurrent herpes flare-ups  Patient's presentation is less likely for contact dermatitis given no recent travel and no use of scented lotions, perfumes or other cosmetic products with fragrances    Plan  Patient can continue to use hydrocortisone 1% PRN for papules  Advised to return to clinic if the rash worsens or new skin changes are present that are not relieved on current therapy

## 2024-10-01 NOTE — ASSESSMENT & PLAN NOTE
Patient has history of recurrent herpes labialis  Takes Valtrex 500 mg BID PRN for flare-ups  Increased to 1000 mg daily outside of office after recent bacterial vs candidal vaginosis for concern of concomitant herpes flare up  Patient reports no new vesicular lesions near her genitals or orally since completing anti-fungal therapy for the vaginosis    Plan  Patient can transition back to Valtrex 500 mg BID PRN once finishing Valtrex 1000 mg course    Orders:    valACYclovir (VALTREX) 500 mg tablet; Take 1 tablet (500 mg total) by mouth 2 (two) times a day as needed (HSV outbreak) for up to 14 days

## 2024-10-01 NOTE — PATIENT INSTRUCTIONS
Please make note of the results and medications from this visit    Please continue taking the Valtrex 1000 mg until the course is completed for the month. Once completed, you can resume taking the Valtrex at 500 mg BID as before for recurrent flare-ups    Please continue using the hydrocortisone 1% cream as needed for the recurrent skin papules and itchiness. Please call the clinic if you notice there are more papules, more itchiness or new rashes/skin changes that don't improve with the hydrocortisone cream    Please go for your scheduled mammogram around March 2025 per your screening schedule

## 2025-02-04 ENCOUNTER — OFFICE VISIT (OUTPATIENT)
Dept: INTERNAL MEDICINE CLINIC | Facility: CLINIC | Age: 45
End: 2025-02-04

## 2025-02-04 VITALS
BODY MASS INDEX: 23.49 KG/M2 | HEART RATE: 73 BPM | WEIGHT: 141 LBS | HEIGHT: 65 IN | DIASTOLIC BLOOD PRESSURE: 78 MMHG | SYSTOLIC BLOOD PRESSURE: 127 MMHG | TEMPERATURE: 98 F

## 2025-02-04 DIAGNOSIS — Z83.438 FAMILY HISTORY OF HYPERLIPIDEMIA: ICD-10-CM

## 2025-02-04 DIAGNOSIS — Z00.00 WELL ADULT EXAM: Primary | ICD-10-CM

## 2025-02-04 DIAGNOSIS — Z23 ENCOUNTER FOR IMMUNIZATION: ICD-10-CM

## 2025-02-04 DIAGNOSIS — R22.2 SUBCUTANEOUS NODULE OF ABDOMINAL WALL: ICD-10-CM

## 2025-02-04 PROCEDURE — 99214 OFFICE O/P EST MOD 30 MIN: CPT | Performed by: FAMILY MEDICINE

## 2025-02-04 PROCEDURE — 99396 PREV VISIT EST AGE 40-64: CPT | Performed by: FAMILY MEDICINE

## 2025-02-04 PROCEDURE — 90471 IMMUNIZATION ADMIN: CPT

## 2025-02-04 PROCEDURE — 90656 IIV3 VACC NO PRSV 0.5 ML IM: CPT

## 2025-02-04 NOTE — ASSESSMENT & PLAN NOTE
Patient has had pain , nodule in C section scar dating back years , she had 2 c sections she was told by gyn that it was endometriosis in the scar . She had abdominal wall limited US in 9/2015 which showed solid nodule in superficial soft tissues anterior to rectus muscle , felt to represent scar endometriosis Pt was told she needed to take OCP indefinitely . She doesn't want to remain on OCP , she has tried going off of the medication and she feels lousy , breast pain , menstrual cramps and the lump in the scar is more painful Recommend re image the area , prior US mentioned MRI or bx of the lump to further define   She is agreeable to this plan , in the meantime continue her healthy diet and exercise   Orders:    US abdomen limited; Future

## 2025-02-04 NOTE — ASSESSMENT & PLAN NOTE
Patient would like to complete fasting labs   Orders:    Comprehensive metabolic panel; Future    Lipid panel; Future

## 2025-02-04 NOTE — PROGRESS NOTES
Adult Annual Physical  Name: Jeanna Bell      : 1980      MRN: [id number]  Encounter Provider: Jolie Montero MD  Encounter Date: 2025   Encounter department: [facility]    Assessment & Plan  Encounter for immunization    Orders:    influenza vaccine preservative-free 0.5 mL IM (Fluzone, Afluria, Fluarix, Flulaval)    Well adult exam  Pt agreeable to flu vaccine , declines COVID booster , fasting labs ordered        Family history of hyperlipidemia  Patient would like to complete fasting labs   Orders:    Comprehensive metabolic panel; Future    Lipid panel; Future    Subcutaneous nodule of abdominal wall  Patient has had pain , nodule in C section scar dating back years , she had 2 c sections she was told by gyn that it was endometriosis in the scar . She had abdominal wall limited US in 2015 which showed solid nodule in superficial soft tissues anterior to rectus muscle , felt to represent scar endometriosis Pt was told she needed to take OCP indefinitely . She doesn't want to remain on OCP , she has tried going off of the medication and she feels lousy , breast pain , menstrual cramps and the lump in the scar is more painful Recommend re image the area , prior US mentioned MRI or bx of the lump to further define   She is agreeable to this plan , in the meantime continue her healthy diet and exercise   Orders:    US abdomen limited; Future    Immunizations and preventive care screenings were discussed with patient today. Appropriate education was printed on patient's after visit summary.    Counseling:  Alcohol/drug use: discussed moderation in alcohol intake, the recommendations for healthy alcohol use, and avoidance of illicit drug use.  Dental Health: discussed importance of regular tooth brushing, flossing, and dental visits.  Exercise: the importance of regular exercise/physical activity was discussed. Recommend exercise 3-5 times per week for at  least 30 minutes.     BMI Counseling: Body mass index is 23.46 kg/m². The BMI is above normal. Nutrition recommendations include decreasing portion sizes, encouraging healthy choices of fruits and vegetables, decreasing fast food intake, consuming healthier snacks, limiting drinks that contain sugar and reducing intake of saturated and trans fat. Exercise recommendations include exercising 3-5 times per week. Rationale for BMI follow-up plan is due to patient being overweight or obese.     Depression Screening and Follow-up Plan: Patient's depression screening was positive with a PHQ-9 score of 6.         History of Present Illness     Adult Annual Physical pt here for well exam , also wants to discuss gyn issue , she has been using OCP since age 15 , hx endometriosis in her C section scar she has had no menses x 10 yrs , she had superficial abd US 9/21/15 solid nodule in superficial soft tissues anterior to rectus muscle , felt to be scar endometriosis   When she stops taking OCP she feels breast pain , menstrual cramps and limp in c section scar is more painful all the time worse near menses   Vision had exam last year amblyopia R   Dental exam 2024   Hearing normal   Diet she could do better with proteins she doesn't like meat or chicken , forces herself to eat some has eggs daily , she does very well with fruits and veggies , water 4 bottles per day , coffee small cup daily , - juice - soda   Exercise was power lifting with friends during COVID , goes gym 3-4 x week weights and cardio 1 hr 1 1/2 hr   -Smoking -ETOH   Fam hx - HTN , - DM - CAD   Fam hx cancer Mom   giving birth to Brother age 28 , Mat Aunt uterine cancer   Review of Systems   Constitutional:  Negative for chills and fever.   HENT:  Negative for ear pain and sore throat.    Eyes:  Negative for pain and visual disturbance.   Respiratory:  Negative for cough and shortness of breath.    Cardiovascular:  Negative for chest pain and  "palpitations.   Gastrointestinal:  Negative for abdominal pain and vomiting.        Lower abdominal wall pain    Genitourinary:  Negative for dysuria and hematuria.        No menses since on OCP x ? 10 yrs ,    Musculoskeletal:  Negative for arthralgias and back pain.   Skin:  Negative for color change and rash.   Neurological:  Negative for seizures and syncope.   All other systems reviewed and are negative.        Objective   /78 (BP Location: Right arm, Patient Position: Sitting, Cuff Size: Adult)   Pulse 73   Temp 98 °F (36.7 °C) (Temporal)   Ht 5' 5\" (1.651 m)   Wt 64 kg (141 lb)   BMI 23.46 kg/m²     Physical Exam  Vitals and nursing note reviewed.   Constitutional:       General: She is not in acute distress.     Appearance: She is well-developed.      Comments: Skin with good color turgor , well hydrated ,no distress noted     HENT:      Head: Normocephalic and atraumatic.      Right Ear: No decreased hearing noted. No middle ear effusion. There is no impacted cerumen.      Left Ear: No decreased hearing noted.  No middle ear effusion. There is no impacted cerumen.      Mouth/Throat:      Pharynx: Oropharynx is clear.   Eyes:      Conjunctiva/sclera: Conjunctivae normal.   Neck:      Thyroid: No thyromegaly.   Cardiovascular:      Rate and Rhythm: Normal rate and regular rhythm.      Heart sounds: Normal heart sounds. No murmur heard.  Pulmonary:      Effort: Pulmonary effort is normal. No respiratory distress.      Breath sounds: Normal breath sounds.   Abdominal:      Palpations: Abdomen is soft.      Tenderness: There is abdominal tenderness. There is no right CVA tenderness or left CVA tenderness.      Comments: Suprapubic mid c section scar area + palpable nodule no superficial skin changes no discoloration not pulsatile    Musculoskeletal:         General: No swelling.      Cervical back: Neck supple.   Lymphadenopathy:      Cervical:      Right cervical: No superficial cervical " adenopathy.     Left cervical: No superficial cervical adenopathy.   Skin:     General: Skin is warm and dry.      Capillary Refill: Capillary refill takes less than 2 seconds.   Neurological:      Mental Status: She is alert.      Comments: Non focal exam   Psychiatric:         Attention and Perception: Attention normal.         Mood and Affect: Mood normal.         Speech: Speech normal.         Behavior: Behavior normal.         Thought Content: Thought content normal.

## 2025-02-07 ENCOUNTER — RESULTS FOLLOW-UP (OUTPATIENT)
Dept: INTERNAL MEDICINE CLINIC | Facility: CLINIC | Age: 45
End: 2025-02-07

## 2025-02-07 ENCOUNTER — APPOINTMENT (OUTPATIENT)
Dept: LAB | Age: 45
End: 2025-02-07
Payer: COMMERCIAL

## 2025-02-07 ENCOUNTER — HOSPITAL ENCOUNTER (OUTPATIENT)
Dept: RADIOLOGY | Age: 45
Discharge: HOME | End: 2025-02-07
Payer: COMMERCIAL

## 2025-02-07 DIAGNOSIS — R22.2 SUBCUTANEOUS NODULE OF ABDOMINAL WALL: ICD-10-CM

## 2025-02-07 DIAGNOSIS — Z83.438 FAMILY HISTORY OF HYPERLIPIDEMIA: ICD-10-CM

## 2025-02-07 LAB
ALBUMIN SERPL BCG-MCNC: 3.8 G/DL (ref 3.5–5)
ALP SERPL-CCNC: 51 U/L (ref 34–104)
ALT SERPL W P-5'-P-CCNC: 11 U/L (ref 7–52)
ANION GAP SERPL CALCULATED.3IONS-SCNC: 7 MMOL/L (ref 4–13)
AST SERPL W P-5'-P-CCNC: 16 U/L (ref 13–39)
BILIRUB SERPL-MCNC: 0.44 MG/DL (ref 0.2–1)
BUN SERPL-MCNC: 15 MG/DL (ref 5–25)
CALCIUM SERPL-MCNC: 9.1 MG/DL (ref 8.4–10.2)
CHLORIDE SERPL-SCNC: 104 MMOL/L (ref 96–108)
CHOLEST SERPL-MCNC: 161 MG/DL (ref ?–200)
CO2 SERPL-SCNC: 28 MMOL/L (ref 21–32)
CREAT SERPL-MCNC: 0.83 MG/DL (ref 0.6–1.3)
GFR SERPL CREATININE-BSD FRML MDRD: 86 ML/MIN/1.73SQ M
GLUCOSE P FAST SERPL-MCNC: 86 MG/DL (ref 65–99)
HDLC SERPL-MCNC: 84 MG/DL
LDLC SERPL CALC-MCNC: 55 MG/DL (ref 0–100)
NONHDLC SERPL-MCNC: 77 MG/DL
POTASSIUM SERPL-SCNC: 4 MMOL/L (ref 3.5–5.3)
PROT SERPL-MCNC: 6.4 G/DL (ref 6.4–8.4)
SODIUM SERPL-SCNC: 139 MMOL/L (ref 135–147)
TRIGL SERPL-MCNC: 108 MG/DL (ref ?–150)

## 2025-02-07 PROCEDURE — 80061 LIPID PANEL: CPT

## 2025-02-07 PROCEDURE — 36415 COLL VENOUS BLD VENIPUNCTURE: CPT

## 2025-02-07 PROCEDURE — 76705 ECHO EXAM OF ABDOMEN: CPT

## 2025-02-07 PROCEDURE — 80053 COMPREHEN METABOLIC PANEL: CPT

## 2025-02-14 DIAGNOSIS — R22.2 SUBCUTANEOUS NODULE OF ABDOMINAL WALL: Primary | ICD-10-CM

## 2025-02-25 ENCOUNTER — TELEPHONE (OUTPATIENT)
Age: 45
End: 2025-02-25

## 2025-02-25 NOTE — TELEPHONE ENCOUNTER
Patient called stating she got a message that she has an appointment tomorrow and on 3/26 advised patient that I don't see she has an appointment with us tomorrow but I do see she has one with us 3/26/25

## 2025-02-25 NOTE — TELEPHONE ENCOUNTER
Patient called thinking her plastics sx appt was tomorrow, I advised her that it is 3/26/25 not 2/26, stated it was made for 2/26 and was changed. Advised her again we have her In for 3/26 and can not see that it was ever made for 2/26 and we can change appt if needed, patient upset and hungup the phone.

## 2025-02-26 NOTE — TELEPHONE ENCOUNTER
Patient received automated messge stating her appt with Dr Perez was 3/3.  There is no history of this date being scheduled and Dr Perez has no openings that day.    Kept appt for 3/26

## 2025-03-06 PROBLEM — Z00.00 WELL ADULT EXAM: Status: RESOLVED | Noted: 2025-02-04 | Resolved: 2025-03-06

## 2025-03-26 ENCOUNTER — CONSULT (OUTPATIENT)
Dept: PLASTIC SURGERY | Facility: CLINIC | Age: 45
End: 2025-03-26
Payer: COMMERCIAL

## 2025-03-26 VITALS
DIASTOLIC BLOOD PRESSURE: 88 MMHG | BODY MASS INDEX: 23.16 KG/M2 | HEIGHT: 65 IN | SYSTOLIC BLOOD PRESSURE: 140 MMHG | HEART RATE: 74 BPM | TEMPERATURE: 97.6 F | WEIGHT: 139 LBS

## 2025-03-26 DIAGNOSIS — R22.2 SUBCUTANEOUS NODULE OF ABDOMINAL WALL: ICD-10-CM

## 2025-03-26 PROCEDURE — 99203 OFFICE O/P NEW LOW 30 MIN: CPT | Performed by: STUDENT IN AN ORGANIZED HEALTH CARE EDUCATION/TRAINING PROGRAM

## 2025-03-26 RX ORDER — DEXTROAMPHETAMINE SACCHARATE, AMPHETAMINE ASPARTATE, DEXTROAMPHETAMINE SULFATE AND AMPHETAMINE SULFATE 3.75; 3.75; 3.75; 3.75 MG/1; MG/1; MG/1; MG/1
1 TABLET ORAL 2 TIMES DAILY
COMMUNITY
Start: 2025-02-20

## 2025-03-26 NOTE — PROGRESS NOTES
Plastic Surgery Consult    Reason for visit: symptomatic endometriosis scar at prior     HPI from 3/26/25  Patient is a pleasant 45 y/o female who presents with symptomatic endometriosis scar at prior  site. She states that she has had the area excised twice in the past but continues to have a firm nodule that increases in size but is controlled with her oral contraceptives. She has been followed by her OBGYN and PCP and was referred to me for further management and treatment options.     ROS: 12 pt ROS negative, except as otherwise noted in HPI    PMH: none  FamHx: non-contrib  SurgHx: , excision of nodule x2  SocHx: no tobacco, nicotine, ETOH  Meds: no blood thinners, no steroids  Allergies: NKDA    PE:    Vitals:    25 1406   BP: 140/88   Pulse: 74   Temp: 97.6 °F (36.4 °C)       General: NC/AT, breathing comfortably on RA  Neuro: CN II-XII grossly intact, symmetric reflexes  HEENT: PERRLA, EOMI, external ears normal, no lesions or deformities, neck supple, trachea midline  Respiratory: CTAB, normal respiratory effort  Cardio: RRR, normal S1, S2, no murmur, rubs, gallops  GI: soft, non-tender, non-distended  Extremities/MSK: normal alignment, mobility, gait, no edema  Skin: no rashes, lesions, subcutaneous nodules    BMI: 23.1    Palpable nodule at the base of  scar, causing discomfort  Mildly depressed scar    Ultrasound: reviewed. Showed small area of fat necrosis vs endometrioma scar    A/P: 45 y/o with symptomatic scar from from  suspected for fat necrosis vs endometrioma scar.  -Discussed excision with complex closure of the symptomatic scar. Discussed that she may continue to have slightly depressed scar as the palpable area of fibrosis will be excised and sent for pathology. I also re-iterated that even with excision of the fibrosis, she may continue to have pain in that area from scarring within the muscle from the prior . Patient  acknowledged.  -I discussed risks of the procedure including infection, bleeding, further fibrosis, abscess, seroma, wound dehiscence, delayed wound healing. Patient acknowledged.  -All questions answered, concerns addressed.  -Consented, will schedule    -Spent 30 minutes in consultation with patient. Greater than 50% of the total time was spent obtaining history, evaluation, performing exam, discussion of management options including post-operative care, answering patient's questions and concerns, chart reviewing, and documentation    Paul Perez MD   [facility]   [address] [place] [address], [address]   [place] [address]   Office: [phone]

## 2025-03-31 ENCOUNTER — HOSPITAL ENCOUNTER (OUTPATIENT)
Dept: RADIOLOGY | Age: 45
Discharge: HOME | End: 2025-03-31
Payer: COMMERCIAL

## 2025-03-31 VITALS — HEIGHT: 65 IN | WEIGHT: 139 LBS | BODY MASS INDEX: 23.16 KG/M2

## 2025-03-31 DIAGNOSIS — R92.8 ABNORMAL SCREENING MAMMOGRAM: ICD-10-CM

## 2025-03-31 DIAGNOSIS — Z12.31 ENCOUNTER FOR SCREENING MAMMOGRAM FOR MALIGNANT NEOPLASM OF BREAST: ICD-10-CM

## 2025-03-31 PROCEDURE — 77063 BREAST TOMOSYNTHESIS BI: CPT

## 2025-03-31 PROCEDURE — 77067 SCR MAMMO BI INCL CAD: CPT

## 2025-04-02 ENCOUNTER — TELEPHONE (OUTPATIENT)
Dept: INTERNAL MEDICINE CLINIC | Facility: CLINIC | Age: 45
End: 2025-04-02

## 2025-04-02 NOTE — TELEPHONE ENCOUNTER
"Patient called because she is very worried about mammogram results that came back \"abnormal.\"     She said she will call back where she got it done, but would like a call from pcp/other doctor in this office to go over them with her.   "

## 2025-04-03 ENCOUNTER — TELEPHONE (OUTPATIENT)
Dept: OTHER | Facility: HOSPITAL | Age: 45
End: 2025-04-03

## 2025-04-07 NOTE — TELEPHONE ENCOUNTER
Sher Reynolds RN       4/3/25 12:04 PM  Note     Patient called back to inform that an appointment for her ultrasound has been made.

## 2025-04-09 ENCOUNTER — TELEPHONE (OUTPATIENT)
Dept: PLASTIC SURGERY | Facility: CLINIC | Age: 45
End: 2025-04-09

## 2025-04-09 NOTE — TELEPHONE ENCOUNTER
Pt returning Sumeet's call    Pt stated she wants to wait until she gets some results on Friday and she will call back afterwards to schedule

## 2025-04-11 ENCOUNTER — HOSPITAL ENCOUNTER (OUTPATIENT)
Dept: ULTRASOUND IMAGING | Facility: CLINIC | Age: 45
Discharge: HOME | End: 2025-04-11
Payer: COMMERCIAL

## 2025-04-11 DIAGNOSIS — R92.8 ABNORMAL MAMMOGRAM: ICD-10-CM

## 2025-04-11 PROCEDURE — 76642 ULTRASOUND BREAST LIMITED: CPT

## 2025-04-11 NOTE — PROGRESS NOTES
Met with patient and Dr. Kocher  regarding recommendation for;    __x___ RIGHT ______LEFT      ___x__Ultrasound guided  ______Stereotactic breast biopsy.      __X___Verbalized understanding.    Reviewed clip placement with patient, pt states understanding: Yes: _x___ No: ____  Comments:    Blood thinners:  No: __x___ Yes: ______ What:          Biopsy teaching sheet given:  Yes: ___X___ No: ________    Pt given contact information and adv to call with any questions/needs    Patient advised to arrive at 1430 for a 1500 appointment

## 2025-04-14 ENCOUNTER — TELEPHONE (OUTPATIENT)
Dept: INTERNAL MEDICINE CLINIC | Facility: CLINIC | Age: 45
End: 2025-04-14

## 2025-04-14 NOTE — TELEPHONE ENCOUNTER
Deedee from [facility] [place]  is calling because the breast biopsy order is not signed. Patients appt is for tomorrow 4/15 and will be cancelled if its not signed in epic  Deedee's phone# [phone], would like a call back confirming if this can be done today

## 2025-04-14 NOTE — TELEPHONE ENCOUNTER
Pt appeared on pursuit list for dep screening. Dep screening completed at physical appt 2/4/2025 however, fu plan was not completed. Spoke with Christiana and she explained there's nothing that can be done. If dep screening was completed fu had to be completed with it in that moment. Just schedule pt for next appropriate appt dictated by provider. PCP wants pt to return next year for physical in 2026.

## 2025-04-15 ENCOUNTER — TELEPHONE (OUTPATIENT)
Dept: PLASTIC SURGERY | Facility: CLINIC | Age: 45
End: 2025-04-15

## 2025-04-15 ENCOUNTER — HOSPITAL ENCOUNTER (OUTPATIENT)
Dept: ULTRASOUND IMAGING | Facility: CLINIC | Age: 45
Discharge: HOME | End: 2025-04-15
Payer: COMMERCIAL

## 2025-04-15 ENCOUNTER — HOSPITAL ENCOUNTER (OUTPATIENT)
Dept: MAMMOGRAPHY | Facility: CLINIC | Age: 45
Discharge: HOME | End: 2025-04-15
Payer: COMMERCIAL

## 2025-04-15 VITALS — HEART RATE: 65 BPM | DIASTOLIC BLOOD PRESSURE: 81 MMHG | SYSTOLIC BLOOD PRESSURE: 131 MMHG

## 2025-04-15 DIAGNOSIS — R92.8 ABNORMAL FINDING ON BREAST IMAGING: ICD-10-CM

## 2025-04-15 PROCEDURE — 19083 BX BREAST 1ST LESION US IMAG: CPT

## 2025-04-15 PROCEDURE — A4648 IMPLANTABLE TISSUE MARKER: HCPCS

## 2025-04-15 PROCEDURE — 88305 TISSUE EXAM BY PATHOLOGIST: CPT | Performed by: PATHOLOGY

## 2025-04-15 RX ORDER — LIDOCAINE HYDROCHLORIDE 10 MG/ML
5 INJECTION, SOLUTION EPIDURAL; INFILTRATION; INTRACAUDAL; PERINEURAL ONCE
Status: COMPLETED | OUTPATIENT
Start: 2025-04-15 | End: 2025-04-15

## 2025-04-15 RX ADMIN — LIDOCAINE HYDROCHLORIDE 10 ML: 10 INJECTION, SOLUTION EPIDURAL; INFILTRATION; INTRACAUDAL; PERINEURAL at 09:47

## 2025-04-15 NOTE — PROGRESS NOTES
Procedure type:    __x___ultrasound guided _____stereotactic    Breast:    _____Left ___x__Right    Location: 12 o'clock 4 cmfn    Needle: 12g    # of passes: 4    Clip: Top Hat     Performed by: Dr. Chow      Pressure held for 5 minutes by: Ari Brady Strips:    ___X__yes _____no    Band aid:    __X___yes_____no    Tolerated procedure:    __X___yes _____no

## 2025-04-16 ENCOUNTER — TELEPHONE (OUTPATIENT)
Dept: MAMMOGRAPHY | Facility: CLINIC | Age: 45
End: 2025-04-16

## 2025-04-16 ENCOUNTER — RESULTS FOLLOW-UP (OUTPATIENT)
Dept: OTHER | Facility: HOSPITAL | Age: 45
End: 2025-04-16

## 2025-04-16 PROCEDURE — 88305 TISSUE EXAM BY PATHOLOGIST: CPT | Performed by: PATHOLOGY

## 2025-04-16 NOTE — PROGRESS NOTES
Post procedure call completed 4/16/2025 at 8:30    Bleeding: _____yes __X___no Pt had small amt of bleeding last night.  She reports no bleeding this morning.    Pain: _____yes ___X___no    Redness/Swelling: ______yes ___X___no    Band aid removed: _____yes ___X__no (discussed removing when she showers)    Steri-Strips intact: ___X___yes _____no (discussed with patient to remove steri strips on .4/20/2025.. if they have not come off on their own)    Pt with no questions at this time, adv will call when results available, adv to call with any questions or concerns, has name/# for contact

## 2025-05-15 ENCOUNTER — TELEPHONE (OUTPATIENT)
Dept: INTERNAL MEDICINE CLINIC | Facility: CLINIC | Age: 45
End: 2025-05-15

## 2025-05-15 NOTE — TELEPHONE ENCOUNTER
Patient called requesting a referral to see a specialist for her breasts. Patient stated she has many questions. She would like to know if she can get a referral or does she have to make an appt first. She stated she would only like to speak with Dr Candelario Montero if possible.

## 2025-05-19 NOTE — TELEPHONE ENCOUNTER
Patient called in again regarding order. She wants to know if it can be placed today, she is very concerned. Informed pt there are no guarantees but I will send her message.

## 2025-05-20 DIAGNOSIS — N63.20 MASS OF LEFT BREAST, UNSPECIFIED QUADRANT: Primary | ICD-10-CM

## 2025-05-21 ENCOUNTER — TELEPHONE (OUTPATIENT)
Dept: HEMATOLOGY ONCOLOGY | Facility: CLINIC | Age: 45
End: 2025-05-21

## 2025-05-21 NOTE — TELEPHONE ENCOUNTER
Referral to Surgical Oncology received.  Chart reviewed by  for Surgical oncology at this time.       Diagnosis: N63.20 (ICD-10-CM) - Mass of left breast, unspecified quadrant     Breast Biopsy completed on 4-  Final Diagnosis   A. Right breast, 12:00 4cmfn 4 passes 12g, ultrasound-guided core needle biopsy:  -   Fibroadenoma.  -   Background breast tissue with no significant histopathologic change.  -   Negative for atypical hyperplasia, carcinoma in situ, and malignancy.     After review of chart, instructions for scheduling added to referral and sent to be scheduled as advised.

## 2025-05-27 ENCOUNTER — HOSPITAL ENCOUNTER (OUTPATIENT)
Dept: ULTRASOUND IMAGING | Facility: CLINIC | Age: 45
Discharge: HOME | End: 2025-05-27

## 2025-05-28 ENCOUNTER — OFFICE VISIT (OUTPATIENT)
Dept: SURGICAL ONCOLOGY | Facility: CLINIC | Age: 45
End: 2025-05-28
Attending: FAMILY MEDICINE
Payer: COMMERCIAL

## 2025-05-28 VITALS
HEART RATE: 76 BPM | HEIGHT: 65 IN | WEIGHT: 139 LBS | SYSTOLIC BLOOD PRESSURE: 126 MMHG | OXYGEN SATURATION: 99 % | BODY MASS INDEX: 23.16 KG/M2 | DIASTOLIC BLOOD PRESSURE: 70 MMHG | TEMPERATURE: 97.9 F

## 2025-05-28 DIAGNOSIS — R92.343 EXTREMELY DENSE TISSUE OF BOTH BREASTS ON MAMMOGRAPHY: ICD-10-CM

## 2025-05-28 DIAGNOSIS — D24.1 FIBROADENOMA OF BREAST, RIGHT: Primary | ICD-10-CM

## 2025-05-28 PROCEDURE — 99204 OFFICE O/P NEW MOD 45 MIN: CPT

## 2025-05-28 RX ORDER — NORETHINDRONE ACETATE AND ETHINYL ESTRADIOL 20; 1 UG/1; MG/1
TABLET ORAL
COMMUNITY
Start: 2025-04-23

## 2025-05-28 NOTE — PROGRESS NOTES
Name: Jeanna Bell      : 1980      MRN: [id number]  Encounter Provider: DENISE Rangel  Encounter Date: 2025   Encounter department: [facility]  :  Assessment & Plan  Fibroadenoma of breast, right  Patient is a 44 year old female presenting for a consult for concerns about her right breast. She requested a referral to our office from her PCP. She had a b/l screening mammo on 3/31/25 which showed an oval mass with circumscribed margins seen in the upper region of the right breast at 12 o'clock in the posterior depth on the MLO and CC views. This has increased slightly in size and further evaluation with ultrasound is recommended to determine whether this is nothing more than an enlarging cyst. She had a dx u/s for further evaluation which measured a 20 mm x 22 mm x 9 mm oval, parallel, hypoechoic mass with circumscribed margins seen in the upper region of the right breast at 12 o'clock in the posterior depth, 4 cm from the nipple. Compared to the previous study, the mass increased in size. This was biopsied and was found to be a benign fibroadenoma, negative for atypical hyperplasia, carcinoma in situ, and malignancy. The radiologist informed her that this is benign mass without concerning features for malignancy and has a low risk for progression into malignancy. She was instructed to return to screening imaging, however patient states she has many questions and varghese like to discuss this finding further. I informed her that fibroadenomas are non-cancerous breast tumors that most often occurs in young women. They may be caused by reproductive hormones. A fibroadenoma feels like a firm, smooth, or rubbery lump in the breast with a well-defined shape. Treatment may include monitoring for changes in the size or feel, or surgery to remove it. She is requesting an u/s in 6 months to monitor the size. She is unsure if she would want surgery to remove  it. She is questioning why this wasn't seen in previous mammograms. I informed her this may have grown quickly in the last year, or this was just not visualized due to her dense tissue until it grew a certain size. We will order an u/s of the right breast to evaluate the size. If this continues to grow she would like this excised. I think this is reasonable. She mentions she might want breast construction to increase the fullness in her breast size. She is inquiring about fat grafting vs implants. I advised against fat grafting, however implants may be an option. I also mentioned the [facility] in [facility] who performs non-surgical breast lifts. She inquired if she would need the fibroadenoma removed before implants, I told her no as imaging would still be able to follow this finding. I will plan to see her back in 6 months to review u/s, perform another breast exam, and discuss u/s findings. She was instructed to call with questions/concerns prior to that time. All questions were answered today.  Extremely dense tissue of both breasts on mammography  Patient does have extremely dense breast tissue. I informed her that this is not uncommon, however dense breast tissue may obscure masses on mammogram. I provided her with information on the automated breast ultrasound (ABUS) which she can obtain annually as an additional breast cancer screening tool.      History of Present Illness   Jeanna Bell is a 44 y.o. year old female who presents to discuss benign biopsy proven fibroadenoma in the right breast. Age of menarche was 15 y.o. she had 2 children and 2 live births. First child was born at 25 y.o. She is pre-menopausal. She states she has endometriosis. She does not get a menstrual cycle. She has never been dx with cancer. She has never taken HRT. She has no family history of breast cancer. A maternal aunt has a hx of endometrial cancer. She has never had genetic testing. She is unsure if she is of Ashkenazi  "Muslim descent.     Review of Systems   Constitutional:  Negative for activity change, appetite change, fatigue and unexpected weight change.   Respiratory:  Negative for cough and shortness of breath.    Cardiovascular:  Negative for chest pain.   Gastrointestinal:  Negative for abdominal pain, diarrhea, nausea and vomiting.   Endocrine: Negative for heat intolerance.   Musculoskeletal:  Negative for arthralgias, back pain and myalgias.   Skin:  Negative for rash.   Neurological:  Negative for weakness and headaches.   Hematological:  Negative for adenopathy.    A complete review of systems is negative other than that noted above in the HPI.    Objective   Visit Vitals  /70   Pulse 76   Temp 97.9 °F (36.6 °C)   Ht 5' 5\" (1.651 m)   Wt 63 kg (139 lb)   LMP  (LMP Unknown)   SpO2 99%   BMI 23.13 kg/m²   OB Status Postmenopausal   Smoking Status Never   BSA 1.7 m²     Pain Screening:  Pain Score: 0-No pain  ECOG    Physical Exam  Chest:   Breasts:     Right: Mass present. No swelling, bleeding, inverted nipple, nipple discharge, skin change or tenderness.      Left: No swelling, bleeding, inverted nipple, mass, nipple discharge, skin change or tenderness.        Comments: Palpable, mobile, soft fibroadenoma on the right breast at 11-12 o'clock       "

## 2025-05-28 NOTE — ASSESSMENT & PLAN NOTE
Patient is a 44 year old female presenting for a consult for concerns about her right breast. She requested a referral to our office from her PCP. She had a b/l screening mammo on 3/31/25 which showed an oval mass with circumscribed margins seen in the upper region of the right breast at 12 o'clock in the posterior depth on the MLO and CC views. This has increased slightly in size and further evaluation with ultrasound is recommended to determine whether this is nothing more than an enlarging cyst. She had a dx u/s for further evaluation which measured a 20 mm x 22 mm x 9 mm oval, parallel, hypoechoic mass with circumscribed margins seen in the upper region of the right breast at 12 o'clock in the posterior depth, 4 cm from the nipple. Compared to the previous study, the mass increased in size. This was biopsied and was found to be a benign fibroadenoma, negative for atypical hyperplasia, carcinoma in situ, and malignancy. The radiologist informed her that this is benign mass without concerning features for malignancy and has a low risk for progression into malignancy. She was instructed to return to screening imaging, however patient states she has many questions and varghese like to discuss this finding further. I informed her that fibroadenomas are non-cancerous breast tumors that most often occurs in young women. They may be caused by reproductive hormones. A fibroadenoma feels like a firm, smooth, or rubbery lump in the breast with a well-defined shape. Treatment may include monitoring for changes in the size or feel, or surgery to remove it. She is requesting an u/s in 6 months to monitor the size. She is unsure if she would want surgery to remove it. She is questioning why this wasn't seen in previous mammograms. I informed her this may have grown quickly in the last year, or this was just not visualized due to her dense tissue until it grew a certain size. We will order an u/s of the right breast to evaluate the  size. If this continues to grow she would like this excised. I think this is reasonable. She mentions she might want breast construction to increase the fullness in her breast size. She is inquiring about fat grafting vs implants. I advised against fat grafting, however implants may be an option. I also mentioned the [facility] in [facility] who performs non-surgical breast lifts. She inquired if she would need the fibroadenoma removed before implants, I told her no as imaging would still be able to follow this finding. I will plan to see her back in 6 months to review u/s, perform another breast exam, and discuss u/s findings. She was instructed to call with questions/concerns prior to that time. All questions were answered today.

## 2025-06-06 ENCOUNTER — OFFICE VISIT (OUTPATIENT)
Dept: INTERNAL MEDICINE CLINIC | Facility: CLINIC | Age: 45
End: 2025-06-06

## 2025-06-06 VITALS
TEMPERATURE: 98.6 F | DIASTOLIC BLOOD PRESSURE: 81 MMHG | WEIGHT: 140.6 LBS | SYSTOLIC BLOOD PRESSURE: 126 MMHG | BODY MASS INDEX: 23.4 KG/M2 | OXYGEN SATURATION: 98 % | HEART RATE: 75 BPM

## 2025-06-06 DIAGNOSIS — M25.461 SWELLING OF RIGHT KNEE: ICD-10-CM

## 2025-06-06 DIAGNOSIS — M25.561 CHRONIC PAIN OF RIGHT KNEE: Primary | ICD-10-CM

## 2025-06-06 DIAGNOSIS — D24.1 FIBROADENOMA OF BREAST, RIGHT: ICD-10-CM

## 2025-06-06 DIAGNOSIS — G89.29 CHRONIC PAIN OF RIGHT KNEE: Primary | ICD-10-CM

## 2025-06-06 PROCEDURE — 99214 OFFICE O/P EST MOD 30 MIN: CPT | Performed by: FAMILY MEDICINE

## 2025-06-06 NOTE — ASSESSMENT & PLAN NOTE
See detailed HPI patient had sudden onset of R knee pain swelling no injury no change in routine , + deformity , swelling feels very tight behind knee , she has been taking motrin 800 mg bid it helps some , she has pain with ambulation and when laying in bed and at rest , she has noted when she does squats she has to place her R lower leg laterally as R knee can't flex properly + swelling , effusion on exam , + popliteal cyst extending into R upper calf , no cords no warmth - erythema , recommend local care avoid offending positions actions , continue motrin 800 mg bid with food , await orthopedist evaluation   Orders:    Ambulatory Referral to Orthopedic Surgery; Future

## 2025-06-06 NOTE — ASSESSMENT & PLAN NOTE
Patient had evaluation locally , she is planning to have surgery in [place] in November , she has a long list of pre op required lab testing , EKG ,there is one lab I can't interpret , she will look into this , schedule pre op visit here in October

## 2025-06-06 NOTE — PROGRESS NOTES
Name: Jeanna Bell      : 1980      MRN: [id number]  Encounter Provider: Jolie Montero MD  Encounter Date: 2025   Encounter department: [facility]    Assessment & Plan  Swelling of right knee    Orders:    Ambulatory Referral to Orthopedic Surgery; Future    Chronic pain of right knee  See detailed HPI patient had sudden onset of R knee pain swelling no injury no change in routine , + deformity , swelling feels very tight behind knee , she has been taking motrin 800 mg bid it helps some , she has pain with ambulation and when laying in bed and at rest , she has noted when she does squats she has to place her R lower leg laterally as R knee can't flex properly + swelling , effusion on exam , + popliteal cyst extending into R upper calf , no cords no warmth - erythema , recommend local care avoid offending positions actions , continue motrin 800 mg bid with food , await orthopedist evaluation   Orders:    Ambulatory Referral to Orthopedic Surgery; Future    Fibroadenoma of breast, right  Patient had evaluation locally , she is planning to have surgery in [place] in November , she has a long list of pre op required lab testing , EKG ,there is one lab I can't interpret , she will look into this , schedule pre op visit here in October        BMI Counseling: Body mass index is 23.4 kg/m². The BMI is above normal. Nutrition recommendations include decreasing portion sizes, encouraging healthy choices of fruits and vegetables, decreasing fast food intake, consuming healthier snacks, limiting drinks that contain sugar and reducing intake of saturated and trans fat. Exercise recommendations include exercising 3-5 times per week. Rationale for BMI follow-up plan is due to patient being overweight or obese.         History of Present Illness     Knee Pain     patient here complains of bilateral knee pain x 3 months   She started with R knee pain 2025 ,no injury no  change in routine laterally and tightness in R knee had to do 1 step at a time swollen in back and upper medial aspect - warmth took advil 800 am and hs , some help but sx persist , she can feel weakness , no locking , no better after warm shower , has pain at night as well L knee started 1 month ago anterior and medial , she has noted when she squats R hip is lower She has never had these things in the past   Review of Systems   Constitutional:  Negative for chills and fever.   HENT:  Negative for ear pain and sore throat.    Eyes:  Negative for pain and visual disturbance.   Respiratory:  Negative for cough and shortness of breath.    Cardiovascular:  Negative for chest pain and palpitations.   Gastrointestinal:  Negative for abdominal pain and vomiting.   Genitourinary:  Negative for dysuria and hematuria.   Musculoskeletal:  Positive for arthralgias. Negative for back pain and gait problem.        Bilateral knee pain R > L she has swelling posteriorly and over all , when she squats she can't flex R knee has to awkwardly re position    Skin:  Negative for color change and rash.   Neurological:  Negative for seizures and syncope.   All other systems reviewed and are negative.    Past Medical History[1]  Past Surgical History[2]  Family History[3]  Social History[4]  Medications[5]  No Known Allergies  Immunization History   Administered Date(s) Administered    COVID-19 PFIZER VACCINE 0.3 ML IM 04/12/2021, 05/06/2021    COVID-19 Pfizer Vac BIVALENT David-sucrose 12 Yr+ IM 11/01/2022    INFLUENZA 11/15/2014, 12/15/2019, 02/14/2022    Influenza, injectable, quadrivalent, preservative free 0.5 mL 01/21/2021, 02/14/2022    Influenza, seasonal, injectable, preservative free 02/04/2025    Tdap 01/21/2021     Objective   /81 (BP Location: Right arm, Patient Position: Sitting, Cuff Size: Standard)   Pulse 75   Temp 98.6 °F (37 °C) (Temporal)   Wt 63.8 kg (140 lb 9.6 oz)   LMP  (LMP Unknown)   SpO2 98%   BMI  23.40 kg/m²     Physical Exam  Vitals and nursing note reviewed.   Constitutional:       General: She is not in acute distress.     Appearance: She is well-developed.      Comments: Skin with good color turgor , well hydrated ,no distress noted     HENT:      Head: Normocephalic and atraumatic.      Right Ear: No decreased hearing noted.      Left Ear: No decreased hearing noted.     Eyes:      Conjunctiva/sclera: Conjunctivae normal.     Neck:      Thyroid: No thyromegaly.     Cardiovascular:      Rate and Rhythm: Normal rate and regular rhythm.      Heart sounds: Normal heart sounds. No murmur heard.  Pulmonary:      Effort: Pulmonary effort is normal. No respiratory distress.      Breath sounds: Normal breath sounds.   Abdominal:      Palpations: Abdomen is soft.      Tenderness: There is no abdominal tenderness.     Musculoskeletal:         General: No swelling.      Cervical back: Neck supple.      Right hip: Normal range of motion.      Left hip: Normal range of motion.      Right knee: Swelling and deformity present. Decreased range of motion.      Left knee: No swelling or deformity. Normal range of motion.      Comments: Noted deformity R knee no discoloration - warmth - open areas , decr ability to flex + popliteal swelling that extends inferiorly into upper calf - pulsatile, - cords      Skin:     General: Skin is warm and dry.      Capillary Refill: Capillary refill takes less than 2 seconds.     Neurological:      Mental Status: She is alert.      Comments: Non focal exam    Psychiatric:         Attention and Perception: Attention normal.         Mood and Affect: Mood normal.         Speech: Speech normal.         Behavior: Behavior normal.                [1]   Past Medical History:  Diagnosis Date    ADHD 01/15/2025    Endometriosis    [2]   Past Surgical History:  Procedure Laterality Date     SECTION      , 2006    HYSTERECTOMY N/A 2016    Procedure: EXCISION  SCAR  ENDOMETRIOSIS painful nodule;  Surgeon: Kiran Mccallum MD;  Location: [facility];  Service:     US GUIDED BREAST BIOPSY RIGHT COMPLETE Right 4/15/2025   [3]   Family History  Problem Relation Name Age of Onset    No Known Problems Mother      No Known Problems Father      No Known Problems Sister      No Known Problems Daughter      No Known Problems Maternal Grandmother      No Known Problems Maternal Grandfather      No Known Problems Paternal Grandmother      No Known Problems Paternal Grandfather      No Known Problems Son      Endometrial cancer Maternal Aunt      Cancer Maternal Aunt      No Known Problems Paternal Aunt      Cancer Paternal Aunt          type unknown    BRCA2 Positive Neg Hx      BRCA2 Negative Neg Hx      BRCA1 Positive Neg Hx      BRCA1 Negative Neg Hx      BRCA 1/2 Neg Hx      Ovarian cancer Neg Hx      Colon cancer Neg Hx      Breast cancer Neg Hx      Breast cancer additional onset Neg Hx     [4]   Social History  Tobacco Use    Smoking status: Never     Passive exposure: Never    Smokeless tobacco: Never   Vaping Use    Vaping status: Never Used   Substance and Sexual Activity    Alcohol use: No    Drug use: No    Sexual activity: Yes     Partners: Male     Birth control/protection: OCP   [5]   Current Outpatient Medications on File Prior to Visit   Medication Sig    amitriptyline (ELAVIL) 25 mg tablet Take 25 mg by mouth daily at bedtime    amitriptyline (ELAVIL) 50 mg tablet Take 1 tablet (50 mg total) by mouth daily at bedtime    amphetamine-dextroamphetamine (ADDERALL) 15 MG tablet Take 1 tablet by mouth in the morning and 1 tablet before bedtime.    Junel 1/20 1-20 MG-MCG per tablet TAKE 1 TABLET BY ORAL ROUTE EVERY DAY - TAKE CONTINUOUSLY    Magnesium 400 MG TABS Take 1 tab PO daily for headaches    Multiple Vitamins-Minerals (MULTIVITAMIN WITH MINERALS) tablet Take 1 tablet by mouth in the morning.    norgestimate-ethinyl estradiol (ORTHO-CYCLEN) 0.25-35 MG-MCG per tablet  Take 1 tablet by mouth daily    valACYclovir (VALTREX) 1,000 mg tablet Take 1 tablet (1,000 mg total) by mouth daily    valACYclovir (VALTREX) 500 mg tablet Take 1 tablet (500 mg total) by mouth 2 (two) times a day as needed (HSV outbreak) for up to 14 days

## 2025-06-20 ENCOUNTER — OFFICE VISIT (OUTPATIENT)
Dept: OBGYN CLINIC | Facility: MEDICAL CENTER | Age: 45
End: 2025-06-20
Payer: COMMERCIAL

## 2025-06-20 ENCOUNTER — APPOINTMENT (OUTPATIENT)
Dept: RADIOLOGY | Facility: MEDICAL CENTER | Age: 45
End: 2025-06-20
Attending: ORTHOPAEDIC SURGERY
Payer: COMMERCIAL

## 2025-06-20 VITALS — BODY MASS INDEX: 23.32 KG/M2 | HEIGHT: 65 IN | WEIGHT: 140 LBS

## 2025-06-20 DIAGNOSIS — M25.562 LEFT KNEE PAIN, UNSPECIFIED CHRONICITY: ICD-10-CM

## 2025-06-20 DIAGNOSIS — M25.561 RIGHT KNEE PAIN, UNSPECIFIED CHRONICITY: ICD-10-CM

## 2025-06-20 DIAGNOSIS — M23.91 INTERNAL DERANGEMENT OF RIGHT KNEE: Primary | ICD-10-CM

## 2025-06-20 PROCEDURE — 73564 X-RAY EXAM KNEE 4 OR MORE: CPT

## 2025-06-20 PROCEDURE — 99214 OFFICE O/P EST MOD 30 MIN: CPT | Performed by: ORTHOPAEDIC SURGERY

## 2025-06-20 NOTE — PROGRESS NOTES
"Orthopaedic Surgery - Office Note  Jeanna Bell (44 y.o. female)   : 1980   MRN: [id number]  Encounter Date: 2025    Assessment & Plan  Internal derangement of right knee    Orders:    MRI knee right  wo contrast; Future  X-rays ordered and reviewed with patient   MRI of right knee for further evaluation due to concerns of meniscus tear  Activity as tolerated  Anti-inflammatories or Tylenol prn pain  Follow-up:  Return pending results of right knee MRI.  Right knee pain, unspecified chronicity    Orders:    XR knee 4+ vw right injury; Future    MRI knee right  wo contrast; Future    Left knee pain, unspecified chronicity    Orders:    XR knee 4+ vw left injury; Future          Chief Complaint / Date of Onset  Bilateral knee pain. Right knee ongoing since 2025. Left ongoing for one month due to compensation   Injury Mechanism / Date  None  Surgery / Date  None    History of Present Illness   Jeanna Bell is a 44 y.o. female who presents for initial evaluation of bilateral knee pain and swelling ongoing since February with no mechanism of injury. She notes the right knee is currently worse than the left. Pain is located diffusely about the right knee and aggravated with ascending and descending stairs, squatting motions, and working as a house keeper. She believes the left knee is only aggravated due to compensating for the right knee pain. She is currently managing pain with ice, heat, and Ibuprofen with minimal relief. No distal numbness or tingling.     Treatment Summary  Medications / Modalities  Ibuprofen, ice, and heat  Bracing / Immobilization  None  Physical Therapy  None  Injections  None  Prior Surgeries  None  Other Treatments  None    Employment / Current Status  House keeper    Sport / Organization / Current Status  Active      Review of Systems  Pertinent items are noted in HPI.  All other systems were reviewed and are negative.      Physical Exam  Ht 5' 5\" (1.651 m)   " Wt 63.5 kg (140 lb)   LMP  (LMP Unknown)   BMI 23.30 kg/m²   Cons: Appears well.  No apparent distress.  Psych: Alert. Oriented x3.  Mood and affect normal.  Eyes: PERRLA, EOMI  Resp: Normal effort.  No audible wheezing or stridor.  CV: Palpable pulse.  No discernable arrhythmia.  No LE edema.  Lymph:  No palpable cervical, axillary, or inguinal lymphadenopathy.  Skin: Warm.  No palpable masses.  No visible lesions.  Neuro: Normal muscle tone.  Normal and symmetric DTR's.     Right Knee Exam  Alignment:  Normal knee alignment.  Inspection:  No edema. No erythema. No ecchymosis. No deformity.  Palpation:  Medial and lateral joint line tenderness. Moderate effusion. Baker's Cyst  ROM:  Knee Extension 0. Knee Flexion 120.  Strength:  Hip Abductors 4/5. 5/5 quadriceps and hamstrings.  Stability:  No objective knee instability. Stable Varus / Valgus stress, Lachman, and Posterior drawer.  Tests:  (+) Tati.  Patella:  Patella tracks centrally with minimal crepitus.  Neurovascular:  Sensation intact in DP/SP/Pham/Sa/T nerve distributions.  2+ DP & PT pulses.  Gait:  Normal.        Studies Reviewed  XR of left knee - Images from 6/20/2025 demonstrate no acute fracture or dislocation. No significant degenerative changes. No lytic or blastic osseous lesion.   XR of right knee - Images from 6/20/2025 demonstrate no acute fracture or dislocation. No significant degenerative changes. Incidental finding of osseous lesion of the tibia.        Procedures  No procedures today.    Medical, Surgical, Family, and Social History  The patient's medical history, family history, and social history, were reviewed and updated as appropriate.    Past Medical History[1]    Past Surgical History[2]    Family History[3]    Social History     Occupational History    Occupation: cleaning    Tobacco Use    Smoking status: Never     Passive exposure: Never    Smokeless tobacco: Never   Vaping Use    Vaping status: Never Used   Substance and  Sexual Activity    Alcohol use: No    Drug use: No    Sexual activity: Yes     Partners: Male     Birth control/protection: OCP       Allergies[4]    Current Medications[5]      Alexandro No    Scribe Attestation      I,:  Alexandro No am acting as a scribe while in the presence of the attending physician.:       I,:  Lukas Rodrigues MD personally performed the services described in this documentation    as scribed in my presence.:                  [1]   Past Medical History:  Diagnosis Date    ADHD 01/15/2025    Endometriosis    [2]   Past Surgical History:  Procedure Laterality Date     SECTION      , 2006    HYSTERECTOMY N/A 2016    Procedure: EXCISION  SCAR ENDOMETRIOSIS painful nodule;  Surgeon: Kiran Mccallum MD;  Location: [facility];  Service:     US GUIDED BREAST BIOPSY RIGHT COMPLETE Right 4/15/2025   [3]   Family History  Problem Relation Name Age of Onset    No Known Problems Mother      No Known Problems Father      No Known Problems Sister      No Known Problems Daughter      No Known Problems Maternal Grandmother      No Known Problems Maternal Grandfather      No Known Problems Paternal Grandmother      No Known Problems Paternal Grandfather      No Known Problems Son      Endometrial cancer Maternal Aunt      Cancer Maternal Aunt      No Known Problems Paternal Aunt      Cancer Paternal Aunt          type unknown    BRCA2 Positive Neg Hx      BRCA2 Negative Neg Hx      BRCA1 Positive Neg Hx      BRCA1 Negative Neg Hx      BRCA 1/2 Neg Hx      Ovarian cancer Neg Hx      Colon cancer Neg Hx      Breast cancer Neg Hx      Breast cancer additional onset Neg Hx     [4] No Known Allergies  [5]   Current Outpatient Medications:     amitriptyline (ELAVIL) 25 mg tablet, Take 25 mg by mouth daily at bedtime, Disp: , Rfl:     amphetamine-dextroamphetamine (ADDERALL) 15 MG tablet, Take 1 tablet by mouth in the morning and 1 tablet before bedtime., Disp: , Rfl:      Junel 1/20 1-20 MG-MCG per tablet, TAKE 1 TABLET BY ORAL ROUTE EVERY DAY - TAKE CONTINUOUSLY, Disp: , Rfl:     Magnesium 400 MG TABS, Take 1 tab PO daily for headaches, Disp: 90 tablet, Rfl: 3    Multiple Vitamins-Minerals (MULTIVITAMIN WITH MINERALS) tablet, Take 1 tablet by mouth in the morning., Disp: , Rfl:     norgestimate-ethinyl estradiol (ORTHO-CYCLEN) 0.25-35 MG-MCG per tablet, Take 1 tablet by mouth daily, Disp: 64 tablet, Rfl: 1    amitriptyline (ELAVIL) 50 mg tablet, Take 1 tablet (50 mg total) by mouth daily at bedtime, Disp: 30 tablet, Rfl: 1    valACYclovir (VALTREX) 1,000 mg tablet, Take 1 tablet (1,000 mg total) by mouth daily, Disp: 30 tablet, Rfl: 0    valACYclovir (VALTREX) 500 mg tablet, Take 1 tablet (500 mg total) by mouth 2 (two) times a day as needed (HSV outbreak) for up to 14 days, Disp: 6 tablet, Rfl: 0

## 2025-07-01 ENCOUNTER — TELEPHONE (OUTPATIENT)
Age: 45
End: 2025-07-01

## 2025-07-01 DIAGNOSIS — M25.562 LEFT KNEE PAIN, UNSPECIFIED CHRONICITY: Primary | ICD-10-CM
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
## 2025-07-01 NOTE — TELEPHONE ENCOUNTER
Caller: Patient     Doctor: Dr. Rodrigues    Reason for call: Patient calling stating that she saw Dr. Rodrigues for B/L knees.  She had MRI for the right knee but she was wondering if it would be possible to get MRI for the Left knee as well.  Patient asking to speak to clinical team.      Call back#: [phone]
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
## 2025-07-16 ENCOUNTER — HOSPITAL ENCOUNTER (OUTPATIENT)
Dept: MRI IMAGING | Facility: HOSPITAL | Age: 45
Discharge: HOME | End: 2025-07-16
Attending: ORTHOPAEDIC SURGERY
Payer: COMMERCIAL

## 2025-07-16 ENCOUNTER — HOSPITAL ENCOUNTER (OUTPATIENT)
Facility: MEDICAL CENTER | Age: 45
Discharge: HOME | End: 2025-07-16
Attending: ORTHOPAEDIC SURGERY

## 2025-07-16 DIAGNOSIS — M23.91 INTERNAL DERANGEMENT OF RIGHT KNEE: ICD-10-CM

## 2025-07-16 DIAGNOSIS — M25.561 RIGHT KNEE PAIN, UNSPECIFIED CHRONICITY: ICD-10-CM

## 2025-07-16 PROCEDURE — 73721 MRI JNT OF LWR EXTRE W/O DYE: CPT

## 2025-07-23 DIAGNOSIS — B00.1 HERPES LABIALIS: ICD-10-CM

## 2025-07-23 RX ORDER — VALACYCLOVIR HYDROCHLORIDE 500 MG/1
500 TABLET, FILM COATED ORAL 2 TIMES DAILY PRN
Qty: 6 TABLET | Refills: 0 | Status: SHIPPED | OUTPATIENT
Start: 2025-07-23 | End: 2025-08-06

## 2025-07-25 ENCOUNTER — OFFICE VISIT (OUTPATIENT)
Dept: OBGYN CLINIC | Facility: MEDICAL CENTER | Age: 45
End: 2025-07-25
Payer: COMMERCIAL

## 2025-07-25 VITALS — WEIGHT: 136.8 LBS | HEIGHT: 65 IN | BODY MASS INDEX: 22.79 KG/M2

## 2025-07-25 DIAGNOSIS — M17.0 OSTEOARTHRITIS OF PATELLOFEMORAL JOINTS OF BOTH KNEES: Primary | ICD-10-CM

## 2025-07-25 PROCEDURE — 99214 OFFICE O/P EST MOD 30 MIN: CPT | Performed by: ORTHOPAEDIC SURGERY

## 2025-07-25 NOTE — PROGRESS NOTES
Orthopaedic Surgery - Office Note  Jeanna Bell (44 y.o. female)   : 1980   MRN: [id number]  Encounter Date: 2025    Assessment & Plan  Osteoarthritis of patellofemoral joints of both knees  Discussed with the patient that the MRI does not so a structural issue that would indicate the need for surgical intervention at this time.   It does however show PF osteoarthritis.   Recommend the patient start a course of PT for both knees.  Patient would like to hold on CS injection at this time.   She can try a compression knee sleeve for the baker's cyst.   Follow up 6-8 weeks   Orders:    Ambulatory Referral to Physical Therapy; Future          Chief Complaint / Date of Onset  Bilateral knee pain. Right knee ongoing since 2025. Left ongoing for one month due to compensation   Injury Mechanism / Date  None  Surgery / Date  None    History of Present Illness   Jeanna Bell is a 44 y.o. female who presents today to discuss the findings of her right knee MRI. Patient reports bilateral knee pain and swelling ongoing since February with no mechanism of injury. She reports the left knee is now worse than the right.  Left knee feel just like the right.  Pain is located diffusely about the right knee and aggravated with ascending and descending stairs, squatting motions, and working as a house keeper. She believes the left knee is only aggravated due to compensating for the right knee pain. She is currently managing pain with ice, heat, and Ibuprofen with minimal relief. No distal numbness or tingling.     Treatment Summary  Medications / Modalities  Ibuprofen, ice, and heat  Bracing / Immobilization  None  Physical Therapy  None  Injections  None  Prior Surgeries  None  Other Treatments  None    Employment / Current Status  House keeper    Sport / Organization / Current Status  Active      Review of Systems  Pertinent items are noted in HPI.  All other systems were reviewed and are  "negative.      Physical Exam  Ht 5' 5\" (1.651 m)   Wt 62.1 kg (136 lb 12.8 oz)   LMP  (LMP Unknown)   BMI 22.76 kg/m²   Cons: Appears well.  No apparent distress.  Psych: Alert. Oriented x3.  Mood and affect normal.  Eyes: PERRLA, EOMI  Resp: Normal effort.  No audible wheezing or stridor.  CV: Palpable pulse.  No discernable arrhythmia.  No LE edema.  Lymph:  No palpable cervical, axillary, or inguinal lymphadenopathy.  Skin: Warm.  No palpable masses.  No visible lesions.  Neuro: Normal muscle tone.  Normal and symmetric DTR's.     Right Knee Exam  Alignment:  Normal knee alignment.  Inspection:  No edema. No erythema. No ecchymosis. No deformity.  Palpation:  Medial and lateral joint line tenderness. Moderate effusion. Baker's Cyst  ROM:  Knee Extension 0. Knee Flexion 120.  Strength:  Hip Abductors 4/5. 5/5 quadriceps and hamstrings.  Stability:  No objective knee instability. Stable Varus / Valgus stress, Lachman, and Posterior drawer.  Tests:  (+) Tati.  Patella:  Patella tracks centrally with minimal crepitus.  Neurovascular:  Sensation intact in DP/SP/Pham/Sa/T nerve distributions.  2+ DP & PT pulses.  Gait:  Normal.        Studies Reviewed  XR of left knee - Images from 6/20/2025 demonstrate no acute fracture or dislocation. No significant degenerative changes. No lytic or blastic osseous lesion.   XR of right knee - Images from 6/20/2025 demonstrate no acute fracture or dislocation. No significant degenerative changes. Incidental finding of osseous lesion of the tibia.      MRI right knee- moderate PF OA, edema in the superolateral corner of Hoffa's fat pad, baker's cyst    Procedures  No procedures today.    Medical, Surgical, Family, and Social History  The patient's medical history, family history, and social history, were reviewed and updated as appropriate.    Past Medical History[1]    Past Surgical History[2]    Family History[3]    Social History     Occupational History    Occupation: " cleaning    Tobacco Use    Smoking status: Never     Passive exposure: Never    Smokeless tobacco: Never   Vaping Use    Vaping status: Never Used   Substance and Sexual Activity    Alcohol use: No    Drug use: No    Sexual activity: Yes     Partners: Male     Birth control/protection: OCP       Allergies[4]    Current Medications[5]      Hermila Terry MA    Scribe Attestation      I,:  Hermila Terry MA am acting as a scribe while in the presence of the attending physician.:       I,:  Lukas Rodrigues MD personally performed the services described in this documentation    as scribed in my presence.:                    [1]   Past Medical History:  Diagnosis Date    ADHD 01/15/2025    Endometriosis    [2]   Past Surgical History:  Procedure Laterality Date     SECTION      , 2006    HYSTERECTOMY N/A 2016    Procedure: EXCISION  SCAR ENDOMETRIOSIS painful nodule;  Surgeon: Kiran Mccallum MD;  Location: [facility];  Service:     US GUIDED BREAST BIOPSY RIGHT COMPLETE Right 4/15/2025   [3]   Family History  Problem Relation Name Age of Onset    No Known Problems Mother      No Known Problems Father      No Known Problems Sister      No Known Problems Daughter      No Known Problems Maternal Grandmother      No Known Problems Maternal Grandfather      No Known Problems Paternal Grandmother      No Known Problems Paternal Grandfather      No Known Problems Son      Endometrial cancer Maternal Aunt      Cancer Maternal Aunt      No Known Problems Paternal Aunt      Cancer Paternal Aunt          type unknown    BRCA2 Positive Neg Hx      BRCA2 Negative Neg Hx      BRCA1 Positive Neg Hx      BRCA1 Negative Neg Hx      BRCA 1/2 Neg Hx      Ovarian cancer Neg Hx      Colon cancer Neg Hx      Breast cancer Neg Hx      Breast cancer additional onset Neg Hx     [4] No Known Allergies  [5]   Current Outpatient Medications:     amitriptyline (ELAVIL) 25 mg tablet, Take 25 mg by mouth  daily at bedtime, Disp: , Rfl:     amitriptyline (ELAVIL) 50 mg tablet, Take 1 tablet (50 mg total) by mouth daily at bedtime, Disp: 30 tablet, Rfl: 1    amphetamine-dextroamphetamine (ADDERALL) 15 MG tablet, Take 1 tablet by mouth in the morning and 1 tablet before bedtime., Disp: , Rfl:     Junel 1/20 1-20 MG-MCG per tablet, TAKE 1 TABLET BY ORAL ROUTE EVERY DAY - TAKE CONTINUOUSLY, Disp: , Rfl:     Magnesium 400 MG TABS, Take 1 tab PO daily for headaches, Disp: 90 tablet, Rfl: 3    Multiple Vitamins-Minerals (MULTIVITAMIN WITH MINERALS) tablet, Take 1 tablet by mouth in the morning., Disp: , Rfl:     norgestimate-ethinyl estradiol (ORTHO-CYCLEN) 0.25-35 MG-MCG per tablet, Take 1 tablet by mouth daily, Disp: 64 tablet, Rfl: 1    valACYclovir (VALTREX) 1,000 mg tablet, Take 1 tablet (1,000 mg total) by mouth daily, Disp: 30 tablet, Rfl: 0    valACYclovir (VALTREX) 500 mg tablet, Take 1 tablet (500 mg total) by mouth 2 (two) times a day as needed (HSV outbreak) for up to 14 days, Disp: 6 tablet, Rfl: 0